# Patient Record
Sex: FEMALE | Race: WHITE | NOT HISPANIC OR LATINO | Employment: UNEMPLOYED | URBAN - METROPOLITAN AREA
[De-identification: names, ages, dates, MRNs, and addresses within clinical notes are randomized per-mention and may not be internally consistent; named-entity substitution may affect disease eponyms.]

---
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
## 2023-01-04 ENCOUNTER — OFFICE VISIT (OUTPATIENT)
Dept: OBGYN CLINIC | Facility: CLINIC | Age: 18
End: 2023-01-04

## 2023-01-04 VITALS
WEIGHT: 127 LBS | SYSTOLIC BLOOD PRESSURE: 108 MMHG | HEIGHT: 65 IN | DIASTOLIC BLOOD PRESSURE: 76 MMHG | BODY MASS INDEX: 21.16 KG/M2

## 2023-01-04 DIAGNOSIS — Z11.3 SCREENING EXAMINATION FOR STD (SEXUALLY TRANSMITTED DISEASE): Primary | ICD-10-CM

## 2023-01-04 DIAGNOSIS — N93.9 MENSTRUAL BLEEDING PROBLEM: ICD-10-CM

## 2023-01-04 NOTE — PROGRESS NOTES
Sativa Stecker 21year-old patient of [facility] here with complaint of bleeding with Joie Gist that started beginning of December, around the 4th  She would experience bleeding every 2 days - bleeding was spotting  This started after she experiencing cramping on the right side  On December 19th she started with a heavier flow that lasted until the 28th  Then it went back to the spotting  This has not occurred since the insertion of her IUD in October of 2021  She is currently being treated for a UTI  She is on day 2 of Keflex  Vaginal cultures collected in December by her prior GYN which were indeterminate  Bleeding has now stopped  She also states the string poke her left side vaginal wall and that she experiences postcoital bleeding  She has been with her current partner for 4 months  No LMP recorded (lmp unknown)  (Menstrual status: Birth Control)  ROS:  As indicated in HPI  All other ROS negative  Physical Exam  Constitutional:       Appearance: Normal appearance  Genitourinary:      No lesions in the vagina  Right Labia: No rash, tenderness, lesions, skin changes or Bartholin's cyst      Left Labia: No tenderness, lesions, skin changes, Bartholin's cyst or rash  No labial fusion noted  Vaginal discharge (mucous yellow tinged) present  No vaginal bleeding or granulation tissue  No vaginal prolapse present  No vaginal atrophy present  Cervix is nulliparous  Cervical discharge (mucous yellow tinged) and friability (mild cervicitis noted) present  IUD strings visualized (IUD string trimmed)  HENT:      Head: Normocephalic and atraumatic  Musculoskeletal:      Cervical back: Neck supple  Neurological:      Mental Status: She is alert  Skin:     General: Skin is warm  Vitals and nursing note reviewed  Anthony Zarco was seen today for menstrual problem      Diagnoses and all orders for this visit:    Screening examination for STD (sexually transmitted disease)  -     Carol(R), Vaginosis/Vaginitis Plus    Menstrual bleeding problem      I explained that there is mild inflammation noted of the cervix and this would explain the reason she experiences bleeding with intimacy  I also explained that it is not uncommon to experience irregular bleeding with an IUD  Sometimes an acute illness can also cause BTB  Culture collected for STD screening  Results will be released to [facility], if abnormal will call to review and discuss treatment plan

## 2023-01-05 LAB
M GENITALIUM DNA SPEC QL NAA+PROBE: NEGATIVE
T VAGINALIS DNA SPEC QL NAA+PROBE: NEGATIVE

## 2023-01-08 LAB
BV BACTERIA RRNA VAG QL NAA+PROBE: POSITIVE
C GLABRATA RNA VAG QL NAA+PROBE: NOT DETECTED
C TRACH RRNA SPEC QL NAA+PROBE: DETECTED
CANDIDA RRNA VAG QL PROBE: NOT DETECTED
N GONORRHOEA RRNA SPEC QL NAA+PROBE: NOT DETECTED
T VAGINALIS RRNA SPEC QL NAA+PROBE: NOT DETECTED

## 2023-01-09 ENCOUNTER — TELEPHONE (OUTPATIENT)
Dept: OBGYN CLINIC | Facility: CLINIC | Age: 18
End: 2023-01-09

## 2023-01-09 DIAGNOSIS — B96.89 BV (BACTERIAL VAGINOSIS): Primary | ICD-10-CM

## 2023-01-09 DIAGNOSIS — A74.9 CHLAMYDIA INFECTION: Primary | ICD-10-CM

## 2023-01-09 DIAGNOSIS — N76.0 BV (BACTERIAL VAGINOSIS): Primary | ICD-10-CM

## 2023-01-09 RX ORDER — FLUCONAZOLE 100 MG/1
100 TABLET ORAL
Qty: 2 TABLET | Refills: 0 | Status: SHIPPED | OUTPATIENT
Start: 2023-01-09 | End: 2023-01-13

## 2023-01-09 RX ORDER — DOXYCYCLINE 100 MG/1
100 TABLET ORAL 2 TIMES DAILY
Qty: 14 TABLET | Refills: 0 | Status: SHIPPED | OUTPATIENT
Start: 2023-01-09 | End: 2023-01-16

## 2023-01-09 RX ORDER — METRONIDAZOLE 500 MG/1
500 TABLET ORAL EVERY 12 HOURS SCHEDULED
Qty: 14 TABLET | Refills: 0 | Status: SHIPPED | OUTPATIENT
Start: 2023-01-09 | End: 2023-01-16

## 2023-01-09 NOTE — TELEPHONE ENCOUNTER
----- [facility]  8:04 AM EST -----  Please inform patient of positive Chlamydia  Doxycycline 100 mg BID for 7 days sent to pharmacy  She is to return in 3 weeks for NAILA  She is to ensure that her partner is tested and treated  They are to abstain until both have been adequately treated

## 2023-01-09 NOTE — TELEPHONE ENCOUNTER
Pt informed of (+) BV & (+) chlamydia culture results from 1/4/2022 - recom tx with Doxycycline & Flagyl, Diflucan preventatively for poss secondary yeast inf  Pt informed to abstain from intercourse until [place] culture - appt for same scheduled for 3 wks  Pt has informed her partner & has appt with pcp for f/u/tx

## 2023-01-19 ENCOUNTER — OFFICE VISIT (OUTPATIENT)
Dept: DERMATOLOGY | Facility: CLINIC | Age: 18
End: 2023-01-19

## 2023-01-19 VITALS — BODY MASS INDEX: 21.29 KG/M2 | WEIGHT: 127.8 LBS | HEIGHT: 65 IN | TEMPERATURE: 97.6 F

## 2023-01-19 DIAGNOSIS — L70.0 ACNE VULGARIS: Primary | ICD-10-CM

## 2023-01-19 NOTE — PATIENT INSTRUCTIONS
Physical Exam and Assessment/Plan by Diagnosis:    ACNE VULGARIS ("COMMON ACNE")    Assessment and Plan: We reviewed the causes of acne, the “kinds” of acne, and the expected clinical course  We discussed treatment options ranging from over-the-counter products, topical retinoids, antibiotics, BP, hormonal therapies (OCPs/spironolactone), and isotretinoin (Accutane)  We reviewed specific over-the-counter interventions and medications  Recommended typical hygiene measures including water-based facial products, washing regularly with mild cleanser, and refraining from picking and popping any pimples  Recommended non-comedogenic sunscreen use daily  Expectations of therapy discussed  Side effects, risks and benefits of medications discussed  A comprehensive handout on Acne was provided  The phone number to call in case of questions or concerns (and instructions to stop medications in such a scenario) was provided  After lengthy discussion of etiology and treatment options, we decided to implement the following personalized treatment plan:     Begin using Skin Medicinals as ordered to affected areas    May use Cetaphil facial wash                   Follow up as needed or if symptoms persist or worsen  Based on a thorough discussion of this condition and the management approach to it (including a comprehensive discussion of the known risks, side effects and potential benefits of treatment), the patient (family) agrees to implement the following specific plan:    --------------------------------------------------------------------------------------  [place]:  In the shower, wash your face, chest and back gently with Cetaphil moisturizing cleanser or Dove Fragrance-free bar  Do not use a luffa or washcloth as these tend to be too irritating to acne-prone skin      ANTIMICROBIAL BENZOYL PEROXIDE:  None  Neutrogena Clear Pore (Benzoyl Peroxide 3% wash):  In the shower, apply this medication to your face, chest and back  Leave this wash on your skin for about 5 minutes and then rinse it off completely while in the shower  If you do not rinse it off completely, then it will bleach your towels or clothing  This medication is now available without prescription (over-the-counter) in most drug stores or at [employer] for about $7 a bottle  PenOxyl wash: Apply on face leave in for 1-2 minutes and completely rinse off    Sulfacetamide sodium-sulfur wash: Apply to face daily  You may use any brand of benzoyl peroxide 10% wash over the counter    ANTIBIOTICS:      “EVENING ROUTINE”    SKIN HYGIENE:  In the shower, wash your face, chest and back gently with Cetaphil moisturizing cleanser or Dove Fragrance-free bar  Do not use a lufa or washcloth as these tend to be too irritating to acne-prone skin  ANTIBIOTICS:        TOPICAL RETINOID:  At 1 hour before bedtime (after washing your face and allowing the skin to completely dry), spread only a single pea-sized amount of this medication evenly over your entire face (avoiding your eyes or mouth):    ORAL CONTRACEPTIVE PILL:  None  Females:  Point of care urine pregnancy test:  Done TODAY  Results:  Point of care urine pregnancy test:  Not done today  Reason:  Blood pressure checked:    Done TODAY; Results:  Not done today    ORAL ISOTRETINOIN:    None  Isotretinoin 10 mg daily, 20 mg daily, 40 mg daily, 60 mg daily, 80 mg daily  Labs reviewed   Reviewed TODAY  Within normal limits and reviewed with patient  Abnormal  Females:  Point of care urine pregnancy test:  Done TODAY  Results:  Point of care urine pregnancy test:  Not done today  Reason:  Patient confirmed in [employer]:    Done TODAY  Not done today  Reason:      REMEMBER:  Always take your acne pills with lots of water! A pill stuck in your throat can cause significant burning and irritation     Drink a full glass of water to ensure the pill gets into your stomach  Avoid “popping” a pill right before bed, and stay upright for at least 1 hour after taking a pill  ACNE:  WHAT ZIT ALL ABOUT? WHY DO I HAVE ACNE/PIMPLES? Your skin is made of layers  To keep the skin from becoming dry and cracked, the skin needs oil  The oil is made in little wells in the deeper layers in the skin  People with acne have glands that make more oil and are more easily plugged, causing the glands to swell  Hormones, bacteria and your inherited tendency to have acne all play a role  The medical term for “pimples” is acne or acne vulgaris (vulgaris means “common”)  Most people get some acne  Acne does not come from being dirty  Instead, it is an expected consequence of changes that occur during normal growth and development  Hormones, bacteria, and your family's tendency to have acne may all play a role  “Whiteheads” or “blackheads” are openings of the glands (glands are the oil factories) onto the surface of the skin  “Blackheads” are not caused by dirt blocking the pores; instead, they result from the oxidation reaction of oil and skin in the pores with the air (like a “rust” reaction)  WHAT ABOUT STRESS? Stress does not “cause” acne but it can make it worse  Make sure you get enough sleep and daily exercise! WHAT ABOUT FOODS/DIET? Try to eat a balanced, healthy diet  Some people feel that certain foods worsen their acne  While there aren't many studies available on this question, severe dietary changes are unlikely to help your acne and may be harmful to the health of your skin  If you find that a certain food seems to aggravate your acne, you may consider avoiding that food  Discuss this with your physician! WHAT CAUSES MY ACNE?   There are four contributors to acne--the body's natural oil (sebum), clogged pores, bacteria (with the scientific name Propionibacterium acnes, or P  acnes, for short), and the body's reaction to the bacteria living in the clogged pores (which causes inflammation)  Here's what happens:    Sebum is produced in the normal oil-making glands in the deeper layers of the skin and reaches the surface through the skin's pores  An increase in certain hormones occurs around the time of puberty, and these hormones trigger the oil glands to produce increased amounts of sebum  Pores with excess oil tend to become clogged more easily  At the same time, P  acnes--one of the many types of bacteria that normally live on everyone's skin--thrives in the excess oil and causes a skin reaction (inflammation)  If a pore is clogged close to the surface, there is little inflammation  However, this results in the formation of “whiteheads” (closed comedones) or “blackheads” (open comedones) at the surface of the skin  A plug that extends to, or forms a little deeper in the pore, or one that enlarges or ruptures may cause more inflammation  The result is red bumps (papules) and pus-filled pimples (pustules)  If plugging happens in the deepest skin layer, the inflammation may be even more severe, resulting in the formation of nodules or cysts  When these types of acne heal, they may leave behind discolored areas or true scars  SKIN HYGIENE:  HOW SHOULD I [facility] MY SKIN? Acne does not come from being dirty, however, washing your face is part of taking good care of your skin and will help keep your face clear  Good skin hygiene is, therefore, critical to support any acne treatment plan  Here are several specific suggestions for practicing good skin hygiene and keeping your skin looking its best:    You should wash acne-prone skin TWICE A DAY: Once in the morning and once in the evening  This does include any showers you take that day, so do not overdo it! Do not scrub the skin with a washcloth or loofah as these can irritate and inflame your acne  Acne does not come from “dirt”, so it is not necessary to scrub the skin clean   In fact, scrubbing may lead to dryness and irritation that makes the acne even worse and harder for patients to tolerate acne medications  Use a gentle facial moisturizing cleanser (Cetaphil Moisturizing Cleanser or Dove Fragrance-Free bar)  Avoid using soaps like Amanda Dolan [address], [facility], or soft/liquid soaps as these products will dry your skin  Do not use any over-the-counter “acne washes” without your doctor's specific instruction to do so  These products often contain salicylic acid or benzoyl peroxide  These ingredients can be helpful in clearing oil from the skin and reducing bacteria, but they may also be drying and can add to irritation  Do not use exfoliating products with microbeads or brushes as these can cause irritation to the skin  Facials and other treatments to remove, squeeze, or “clean out” pores are not recommended  Manipulating the skin in this way can make acne worse and can lead to severe infections and/or scarring  It also increases the likelihood that the skin will not be able to tolerate acne medications  Try not to “pop pimples” or pick at your acne as this can delay healing and may result in scarring or skin color changes (“dark spots”) that are often more noticeable than the acne itself  Picking/popping acne can also cause a serious skin infection  Wash or change your pillow case once to twice a week, especially if you use products in your hair  Wash the skin as soon as possible after playing sports or other activities that cause a lot of sweating  Also, pay attention to how your sports equipment (shoulder pads, helmet strap, etc ) might be making your acne worse  When you use makeup, moisturizer, or sunscreen make sure that these products are labeled “non-comedogenic,” or “won't clog pores,” or “won't cause acne ”       SHOULD I TREAT MY ACNE? There are a number of other skin conditions that can look like acne   If there is any question about the diagnosis, then the person should be evaluated by a board certified pediatric and adolescent dermatologist   A physician should examine any child with acne who is between the ages of 3and 9years of age, as acne in this “mid-childhood” age group is not normal and may signal an underlying problem  If a “preadolescent” (9to 6years of age) or “adolescent” (15to 25years of age) has mild acne and the condition is not bothersome to the individual, proper and regular skin care (what your doctor may call “skin hygiene”) may be all that is needed at this point  Many people do, however, need specific acne medications to help their skin look and feel its best  Your doctor will tell you if you are one of these people  If so, you may be advised to use an over-the-counter or prescription medication that is applied to the skin (a “topical medication”) or if the addition of an oral medication (a medication “taken by Re Pet) is needed  The good news is that the medications work well when used properly! Some specific factors that may influence the choice of acne therapy include:    Severity  The number and type of skin lesions (papules or comedones) and the degree of inflammation (mild, moderate or severe)  Scarring  Scarring is most common when acne is severe, but it can happen even in children with mild acne  Impact  If a child is experiencing emotional complications because of the acne or is experiencing negative comments from other children  Cost of the acne medications  An acne expert can help to keep “out of pocket” costs to a minimum by utilizing the correct medications and the least expensive options  The patient's skin type (“oily” versus “dry” or “combination skin,” for example)  Potential side effects of the medication  The ease or overall complexity of the treatment plan or medication  WHAT ACNE TREATMENTS ARE AVAILABLE?     Medications for acne try to stop the formation of new pimples by reducing or removing the oil, bacteria, and other things (like dead skin cells) that clog the pores  They can also decrease the inflammation or irritation response of the skin to bacteria  It may take from 6 to 8 weeks (about 2 months!) before you see any improvement and know if the medication is effective  It takes the layers of skin this long to regenerate  Remember, these medications do not “cure” the condition--the acne improves because of the medication  Therefore, treatment must be continued in order to prevent the return of acne lesions  There are many types of acne treatments  Some are applied to the skin (“topical” medications) and some are taken by mouth (“oral” medications)  In most cases of mild acne, the doctor will start with a topical medication  There are many different topical medications that are helpful for acne  If acne is more severe and it does not respond adequately to a topical medication, or if it covers large body surface areas such as the back and/or chest, oral antibiotics such as Doxycycline or Minocycline and/or oral hormone therapy such as Oral Contraceptive Pills or Spironolactone may be prescribed  In the most severe cases, isotretinoin (Accutane) may be used  In general, it is usually best to start with acne medications that are least likely to cause side effects but are at the same time capable of addressing the specific causes for the acne  Some patients have a good result with just one medication, but many will need to use a combination of treatments: two or more different topical agents or an oral medication plus a topical medication  Another treatment used for acne may include corticosteroid injections, which are used to help relieve pain, decrease the size, and encourage the healing of large, inflamed acne nodules  Also, dermatologists sometimes perform “acne surgery,” using a fine needle, a pointed blade, or an instrument known as a comedone extractor to mechanically clean out clogged pores   One must always weigh the risk for inducing a scar with the potential benefits of any procedure  Prior treatment with topical retinoids can “loosen” whiteheads and blackheads and make it easier to physically remove such lesions  Heat-based devices, and light and laser therapy are being studied to see whether there is any role for such treatments in mild to moderate acne  At this time, there is not enough evidence to make general recommendations about their use  TOPICAL ACNE MEDICATIONS    WHAT KIND OF TOPICALS ARE THERE? Benzoyl peroxide (BP) helps to fight inflammation and is anti-microbial (kills bacteria, viruses, and other microorganisms) and is believed to help prevent resistance of bacteria to topical antibiotics  A benzoyl peroxide “wash” may be recommended for use on large areas such as the chest and/or back  Mild irritation and dryness are common when first using benzoyl peroxide-containing products  Be careful because benzoyl peroxide can bleach towels and clothing! Retinoids (such as adapalene, tretinoin, or tazarotene) unplug the oil glands by helping peel away the layers of skin and other things plugging the opening of the glands  Mild irritation and dryness are common when first using these products  Facial waxing and other skin procedures can lead to excessive irritation and should be avoided during retinoid therapy  Antibiotics fight bacteria and help decrease inflammation  Topical antibiotics commonly used in acne include clindamycin, erythromycin, and combination agents (such as clindamycin/benzoyl peroxide or erythromycin/benzoyl peroxide)  Mild irritation and dryness are common when first using these products  Typically, topical antibiotics should not be used alone as treatment for acne  Other topical agents include salicylic acid, azelaic acid, dapsone, and sulfacetamide  Mild irritation and dryness can also occur when first using these products      USING YOUR TOPICAL TREATMENTS LIKE A PRO  Apply topical medications only to clean, dry skin  Topical medications may lead to significant dryness of the affected areas  To minimize this, wait 15-20 minutes after washing before applying your topical medication  These medications work deep in the skin to prevent new breakouts  “Spot treatment” of individual pimples does not do much  When applying topical medications to the face, use the “5-dot” method  Start by placing a small pea-sized amount of the medication on your finger  Then, place “dots” in each of five locations of your face: Mid-forehead, each cheek, nose, and chin  Next, rub the medication into the entire area of skin - not just on individual pimples! Try to avoid the delicate skin around your eyes and corners of your mouth  The medications are not magic! They take weeks if not months to work  Be patient and use your medicine on a daily basis or as directed for six weeks before asking if your skin looks better  Try not to miss more than one or two days each week when using your medications  If you are starting a new medication, then try using it “every other night” or even “every third night ” Gradually work up to [facility] a day ”  This will give your skin time to adjust   The same medications often come in various forms or formulations: Creams, ointments, lotions, gels, microspheres, or foams  Use the formulation that has been recommended and don't switch to other forms unless instructed  Some forms (such as alcohol based gels) may be more drying and less tolerable for certain skin types  Sometimes individual medications are not as effective as a combination of two or more agents  The doctor may need to try several medications or combinations before finding the one that is best for that patient  Moisturizer, sunscreen, and make-up may be used in conjunction with topical acne medications  In general, acne medications are applied first so they may directly contact the skin   Ask your physician to review specific application instructions! It is especially important to always use sunscreen when using a topical retinoid or oral antibiotic  These drugs can make your skin more sensitive to the sun  In general, sunscreen gets applied AFTER any acne medications  Don't stop using your acne medications just because your acne got better  Remember, the acne is better because of the medication, and prevention is the fried to treatment  ORAL ACNE MEDICATIONS    ORAL ANTIBIOTICS  Antibiotics include tetracycline-class medicines (which include the most commonly used oral antibiotics for acne, minocycline, and doxycycline), erythromycin, trimethoprim-sulfamethoxazole, and occasionally cephalexin or azithromycin  These drugs may decrease bacteria and inflammation, and they are most effective for moderate-to-severe “inflammatory” acne  A product containing benzoyl peroxide should be used along with these antibiotics to help decrease the possibility of microbial resistance  Always take your acne pills with lots of water! A pill stuck in your throat can cause significant burning and irritation  Drink a full glass of water to ensure the pill gets into your stomach  Avoid “popping” a pill right before bed, and stay upright for at least 1 hour after taking a pill  DOXYCYCLINE   This medication is usually taken ONCE or TWICE per day, as instructed by your physician  NOTE: Always take this medication with lots of water! A pill stuck in the throat can cause significant burning and irritation  Avoid “popping” a pill right before bed & stay upright for at least one hour after taking a pill  WARNING: Doxycycline increases your sensitivity to the sun, so practice excellent sun protection!  If you notice any of the following, stop using the medication and notify your health care provider: headaches; blurred vision; dizziness; sun sensitivity; heartburn-stomach pain; irritation of the esophagus; darkening of scars, gums, or teeth (more often with minocycline); nail changes; yellowing of the eyes or skin (indicating possible liver disease); joint pains-and flu-like symptoms  Taking oral antibiotics with food may help with symptoms of upset stomach  COMMON SIDE EFFECTS: Headaches; dizziness; sun sensitivity; irritation of the throat; discoloration of scars, gums, or teeth; nail changes  MINOCYCLINE   This medication is usually taken ONCE or TWICE per day, as instructed by your physician  NOTE: Always take this medication with lots of water! A pill stuck in the throat can cause significant burning and irritation  Avoid “popping” a pill right before bed & stay upright for at least one hour after taking a pill  WARNING: Though less likely than doxycycline, minocycline may increase your sensitivity to the sun, so practice excellent sun protection! If you notice any of the following, stop using the medication and notify your health care provider: headaches; blurred vision; dizziness; sun sensitivity; heartburn-stomach pain; irritation of the throat; darkening of scars, gums, or teeth; nail changes; yellowing of the eyes or skin (indicating possible liver disease); joint pains-and flu-like symptoms  Taking oral antibiotics with food may help with symptoms of upset stomach  COMMON SIDE EFFECTS: Headaches; dizziness; sun sensitivity; irritation of the throat; discoloration of scars, gums, or teeth (often with minocycline); nail changes  Minocycline can rarely cause liver disease, joint pains, severe skin rashes, and flu-like symptoms  If you should notice yellowing of the eyes or skin, or any of the above, notify your doctor and stop using the medication immediately  HORMONAL THERAPY  Hormonal treatment is used only in females and usually consists of oral contraceptives (birth control pills)  Spironolactone is also sometimes used      ORAL CONTRACEPTIVE PILLS   This medication is also known as the “Birth Control Pill ”  We use it for hormonal regulation of acne  Take this medication as directed on the medication packet  NOTE: Try to find a regular time in your day to take the pill so that you don't forget  The best time is about half an hour after a meal or snack, or at bedtime  If you do forget to take your daily pill at the regular time, take one as soon as you remember and take the next at your regular scheduled time  WARNING: Do not take this medication until discussing it with your physician if you smoke, are pregnant (or trying to become pregnant or could be pregnant), have a personal history of breast cancer, have any artificial hardware or implants, have a condition called Factor 5 Leiden deficiency, have a family history of clotting problems, regularly have migraine headaches (especially with aura or due to flashing lights), or have any vaginal bleeding other than that associated with your menstrual cycle  ORAL ISOTRETINOIN (used to be called the brand name “ACCUTANE”)  Isotretinoin, a derivative of vitamin A, is a powerful drug with several significant potential side effects  It is reserved for acne which is severe or when other medications have not worked well enough  It used to be sold under the brand name “Accutane” but now several versions exist       HAVING PROBLEMS WITH ANY OF YOUR TREATMENTS? You should not be able to see any of the medicines on your face  If you can see a white film on your skin after you apply the medication, there is too much medicine in that area and you need to apply a thinner coat and make sure it is spread evenly on your face  If your skin gets too dry, you can apply a light (“non-comedogenic”) moisturizer on top of your medicine or you may switch to using the medicine every other day instead of every day  If your skin is still too irritated, you may need to switch to a milder medication        If your skin is red and very itchy, you may be allergic to the medication and you should stop using it  COMMON POSSIBLE SIDE EFFECTS OF MEDICATIONS    Retinoids - dryness, redness, increased sun sensitivity  Benzoyl peroxide - drying, redness, bleaching of clothes, towels and sheets, allergy  Doxycycline - headaches; dizziness; irritation of the throat; nail changes; discoloration of teeth  Sun sensitivity - even if you have dark skin, this medicine can make you burn more easily  Make sure you protect yourself from the sun, either by avoiding being outside between 11 AM and 3 PM, wearing and reapplying sunscreen/sunblock, or wearing sun protective clothing  Nausea/vomiting - if you experience nausea with this medication, take it with food  Minocycline - headaches; dizziness; vision problems,  irritation of the throat; discoloration of scars, gums, or teeth  Can rarely cause liver disease, joint pains, and flu-like symptoms  If you should notice yellowing of the skin or any of the above, notify your doctor and stop using the medication  Birth Control Pills - nausea; headaches; breast tenderness; feeling bloated; mood changes  Spotting between periods may occur for the first three weeks of the medication, but this is not serious  It may last for two or three cycles  Please call us if the bleeding is heavier than a light flow or lasts for more than a few days  WHEN AND WHERE TO CALL WITH CONCERNS  We are here to help! If you experience any unusual symptoms, then stop taking or using the medication and call our office at [phone] ([phone])  It is better to be safe than to be sorry! [facility]     We use this service to prescribe medications that are often not covered by insurance  Your physician will send your prescription to the pharmacy  You will receive an email from [email or web address] where you will follow the instructions within the email to provide your billing and shipping information  Your medicine will be shipped directly to you      If you have any questions, you can call [phone] or email [email or web address]

## 2023-01-19 NOTE — PROGRESS NOTES
[facility] Note     Patient Name: Aguila Porras  Encounter Date: 01/19/2023     Have you been cared for by a [place] [id number] Dermatologist in the last 3 years and, if so, which description applies to you? Yes  I have been here within the last 3 years, and my medical history has NOT changed since that time  I am FEMALE/of child-bearing potential     REVIEW OF SYSTEMS:  Have you recently had or currently have any of the following? · No changes in my recent health  PAST MEDICAL HISTORY:  Have you personally ever had or currently have any of the following? If "YES," then please provide more detail  · No changes in my medical history  FAMILY HISTORY:  Any "first degree relatives" (parent, brother, sister, or child) with the following? • No changes in my family's known health  PATIENT EXPERIENCE:    • Do you want the Dermatologist to perform a COMPLETE skin exam today including a clinical examination under the "bra and underwear" areas? NO  • If necessary, do we have your permission to call and leave a detailed message on your Preferred Phone number that includes your specific medical information? Yes      No Known Allergies   Current Outpatient Medications:   •  Adapalene-Benzoyl Peroxide 0 1-2 5 % gel, Apply to skin once daily, Disp: 45 g, Rfl: 3  •  ketoconazole (NIZORAL) 2 % shampoo, Apply 1 application topically once for 1 dose SHMP 2x/wk x 8 wk, then as needed  Leave on 5 min then rinse , Disp: 120 mL, Rfl: 2  •  levonorgestrel (KYLEENA) 19 5 MG intrauterine device, 1 each by Intrauterine route once, Disp: , Rfl:   •  triamcinolone (KENALOG) 0 1 % ointment, Apply twice daily for up to 2 weeks, as needed for irritation  , Disp: 30 g, Rfl: 0          • Whom besides the patient is providing clinical information about today's encounter?   o NO ADDITIONAL HISTORIAN (patient alone provided history)    Physical Exam and Assessment/Plan by Diagnosis:    ACNE VULGARIS ("COMMON ACNE")    Physical Exam:  • Psychiatric/Mood:Normal  • Anatomic Location Affected:  Face, chest and back; see attached photos  •   • Pertinent Positives:  • Pertinent Negatives: Additional History of Present Condition:  Patient reports use of adapalene-benzoyl peroxide along with a daily moisturizer irritated her face  Assessment and Plan:  • We reviewed the causes of acne, the “kinds” of acne, and the expected clinical course  • We discussed treatment options ranging from over-the-counter products, topical retinoids, antibiotics, BP, hormonal therapies (OCPs/spironolactone), and isotretinoin (Accutane)  • We reviewed specific over-the-counter interventions and medications  Recommended typical hygiene measures including water-based facial products, washing regularly with mild cleanser, and refraining from picking and popping any pimples  • Recommended non-comedogenic sunscreen use daily  • Expectations of therapy discussed  Side effects, risks and benefits of medications discussed  • A comprehensive handout on Acne was provided  • The phone number to call in case of questions or concerns (and instructions to stop medications in such a scenario) was provided  • After lengthy discussion of etiology and treatment options, we decided to implement the following personalized treatment plan:  ·    Begin using Skin Medicinals as ordered to affected areas  ·   May use Cetaphil facial wash  ·                  Follow up as needed or if symptoms persist or worsen  Based on a thorough discussion of this condition and the management approach to it (including a comprehensive discussion of the known risks, side effects and potential benefits of treatment), the patient (family) agrees to implement the following specific plan:  [facility]     We use this service to prescribe medications that are often not covered by insurance  Your physician will send your prescription to the pharmacy      You will receive an email from [employer] where you will follow the instructions within the email to provide your billing and shipping information  Your medicine will be shipped directly to you  If you have any questions, you can call [phone] or email [email or web address]    Tretinoin: 0 025%  Niacinamide: 2%  Hyaluronic Acid: 0 25%  Vehicle: Cream     --------------------------------------------------------------------------------------  YOUR PERSONALIZED ACNE ACTION PLAN    “MORNING ROUTINE”    1) SKIN HYGIENE:  In the shower, wash your face, chest and back gently with Cetaphil moisturizing cleanser or Dove Fragrance-free bar  Do not use a luffa or washcloth as these tend to be too irritating to acne-prone skin  2) ANTIMICROBIAL BENZOYL PEROXIDE:  • None  • Neutrogena Clear Pore (Benzoyl Peroxide 3% wash): In the shower, apply this medication to your face, chest and back  Leave this wash on your skin for about 5 minutes and then rinse it off completely while in the shower  If you do not rinse it off completely, then it will bleach your towels or clothing  This medication is now available without prescription (over-the-counter) in most drug stores or at [employer] for about $7 a bottle  • PenOxyl wash: Apply on face leave in for 1-2 minutes and completely rinse off    • Sulfacetamide sodium-sulfur wash: Apply to face daily  • You may use any brand of benzoyl peroxide 10% wash over the counter    3) ANTIBIOTICS:      “EVENING ROUTINE”    1) SKIN HYGIENE:  In the shower, wash your face, chest and back gently with Cetaphil moisturizing cleanser or Dove Fragrance-free bar  Do not use a lufa or washcloth as these tend to be too irritating to acne-prone skin      2) ANTIBIOTICS:        3) TOPICAL RETINOID:  At 1 hour before bedtime (after washing your face and allowing the skin to completely dry), spread only a single pea-sized amount of this medication evenly over your entire face (avoiding your eyes or mouth):      REMEMBER:  Always take your acne pills with lots of water! A pill stuck in your throat can cause significant burning and irritation  Drink a full glass of water to ensure the pill gets into your stomach  Avoid “popping” a pill right before bed, and stay upright for at least 1 hour after taking a pill  ACNE:  WHAT ZIT ALL ABOUT? WHY DO I HAVE ACNE/PIMPLES? Your skin is made of layers  To keep the skin from becoming dry and cracked, the skin needs oil  The oil is made in little wells in the deeper layers in the skin  People with acne have glands that make more oil and are more easily plugged, causing the glands to swell  Hormones, bacteria and your inherited tendency to have acne all play a role  The medical term for “pimples” is acne or acne vulgaris (vulgaris means “common”)  Most people get some acne  Acne does not come from being dirty  Instead, it is an expected consequence of changes that occur during normal growth and development  Hormones, bacteria, and your family's tendency to have acne may all play a role  “Whiteheads” or “blackheads” are openings of the glands (glands are the oil factories) onto the surface of the skin  “Blackheads” are not caused by dirt blocking the pores; instead, they result from the oxidation reaction of oil and skin in the pores with the air (like a “rust” reaction)  WHAT ABOUT STRESS? Stress does not “cause” acne but it can make it worse  Make sure you get enough sleep and daily exercise! WHAT ABOUT FOODS/DIET? Try to eat a balanced, healthy diet  Some people feel that certain foods worsen their acne  While there aren't many studies available on this question, severe dietary changes are unlikely to help your acne and may be harmful to the health of your skin  If you find that a certain food seems to aggravate your acne, you may consider avoiding that food  Discuss this with your physician!     WHAT CAUSES MY ACNE?  There are four contributors to acne--the body's natural oil (sebum), clogged pores, bacteria (with the scientific name Propionibacterium acnes, or P  acnes, for short), and the body's reaction to the bacteria living in the clogged pores (which causes inflammation)  Here's what happens:    • Sebum is produced in the normal oil-making glands in the deeper layers of the skin and reaches the surface through the skin's pores  An increase in certain hormones occurs around the time of puberty, and these hormones trigger the oil glands to produce increased amounts of sebum  • Pores with excess oil tend to become clogged more easily  • At the same time, P  acnes--one of the many types of bacteria that normally live on everyone's skin--thrives in the excess oil and causes a skin reaction (inflammation)  • If a pore is clogged close to the surface, there is little inflammation  However, this results in the formation of “whiteheads” (closed comedones) or “blackheads” (open comedones) at the surface of the skin  • A plug that extends to, or forms a little deeper in the pore, or one that enlarges or ruptures may cause more inflammation  The result is red bumps (papules) and pus-filled pimples (pustules)  • If plugging happens in the deepest skin layer, the inflammation may be even more severe, resulting in the formation of nodules or cysts  When these types of acne heal, they may leave behind discolored areas or true scars  SKIN HYGIENE:  HOW SHOULD I [facility] MY SKIN? Acne does not come from being dirty, however, washing your face is part of taking good care of your skin and will help keep your face clear  Good skin hygiene is, therefore, critical to support any acne treatment plan  Here are several specific suggestions for practicing good skin hygiene and keeping your skin looking its best:    • You should wash acne-prone skin TWICE A DAY: Once in the morning and once in the evening   This does include any showers you take that day, so do not overdo it! • Do not scrub the skin with a washcloth or loofah as these can irritate and inflame your acne  Acne does not come from “dirt”, so it is not necessary to scrub the skin clean  In fact, scrubbing may lead to dryness and irritation that makes the acne even worse and harder for patients to tolerate acne medications  • Use a gentle facial moisturizing cleanser (Cetaphil Moisturizing Cleanser or Dove Fragrance-Free bar)  Avoid using soaps like Rachel Mckenna, Amanda Murray 39, [facility], or soft/liquid soaps as these products will dry your skin  • Do not use any over-the-counter “acne washes” without your doctor's specific instruction to do so  These products often contain salicylic acid or benzoyl peroxide  These ingredients can be helpful in clearing oil from the skin and reducing bacteria, but they may also be drying and can add to irritation  • Do not use exfoliating products with microbeads or brushes as these can cause irritation to the skin  • Facials and other treatments to remove, squeeze, or “clean out” pores are not recommended  Manipulating the skin in this way can make acne worse and can lead to severe infections and/or scarring  It also increases the likelihood that the skin will not be able to tolerate acne medications  • Try not to “pop pimples” or pick at your acne as this can delay healing and may result in scarring or skin color changes (“dark spots”) that are often more noticeable than the acne itself  Picking/popping acne can also cause a serious skin infection  • Wash or change your pillow case once to twice a week, especially if you use products in your hair  • Wash the skin as soon as possible after playing sports or other activities that cause a lot of sweating  Also, pay attention to how your sports equipment (shoulder pads, helmet strap, etc ) might be making your acne worse    • When you use makeup, moisturizer, or sunscreen make sure that these products are labeled “non-comedogenic,” or “won't clog pores,” or “won't cause acne ”       SHOULD I TREAT MY ACNE? There are a number of other skin conditions that can look like acne  If there is any question about the diagnosis, then the person should be evaluated by a board certified pediatric and adolescent dermatologist   A physician should examine any child with acne who is between the ages of 3and 9years of age, as acne in this “mid-childhood” age group is not normal and may signal an underlying problem  If a “preadolescent” (9to 6years of age) or “adolescent” (15to 25years of age) has mild acne and the condition is not bothersome to the individual, proper and regular skin care (what your doctor may call “skin hygiene”) may be all that is needed at this point  Many people do, however, need specific acne medications to help their skin look and feel its best  Your doctor will tell you if you are one of these people  If so, you may be advised to use an over-the-counter or prescription medication that is applied to the skin (a “topical medication”) or if the addition of an oral medication (a medication “taken by [place]) is needed  The good news is that the medications work well when used properly! Some specific factors that may influence the choice of acne therapy include:    • Severity  The number and type of skin lesions (papules or comedones) and the degree of inflammation (mild, moderate or severe)  • Scarring  Scarring is most common when acne is severe, but it can happen even in children with mild acne  • Impact  If a child is experiencing emotional complications because of the acne or is experiencing negative comments from other children  • Cost of the acne medications  An acne expert can help to keep “out of pocket” costs to a minimum by utilizing the correct medications and the least expensive options    • The patient's skin type (“oily” versus “dry” or “combination skin,” for example)  • Potential side effects of the medication  • The ease or overall complexity of the treatment plan or medication  WHAT ACNE TREATMENTS ARE AVAILABLE? Medications for acne try to stop the formation of new pimples by reducing or removing the oil, bacteria, and other things (like dead skin cells) that clog the pores  They can also decrease the inflammation or irritation response of the skin to bacteria  It may take from 6 to 8 weeks (about 2 months!) before you see any improvement and know if the medication is effective  It takes the layers of skin this long to regenerate  Remember, these medications do not “cure” the condition--the acne improves because of the medication  Therefore, treatment must be continued in order to prevent the return of acne lesions  There are many types of acne treatments  Some are applied to the skin (“topical” medications) and some are taken by mouth (“oral” medications)  In most cases of mild acne, the doctor will start with a topical medication  There are many different topical medications that are helpful for acne  If acne is more severe and it does not respond adequately to a topical medication, or if it covers large body surface areas such as the back and/or chest, oral antibiotics such as Doxycycline or Minocycline and/or oral hormone therapy such as Oral Contraceptive Pills or Spironolactone may be prescribed  In the most severe cases, isotretinoin (Accutane) may be used  In general, it is usually best to start with acne medications that are least likely to cause side effects but are at the same time capable of addressing the specific causes for the acne  Some patients have a good result with just one medication, but many will need to use a combination of treatments: two or more different topical agents or an oral medication plus a topical medication       Another treatment used for acne may include corticosteroid injections, which are used to help relieve pain, decrease the size, and encourage the healing of large, inflamed acne nodules  Also, dermatologists sometimes perform “acne surgery,” using a fine needle, a pointed blade, or an instrument known as a comedone extractor to mechanically clean out clogged pores  One must always weigh the risk for inducing a scar with the potential benefits of any procedure  Prior treatment with topical retinoids can “loosen” whiteheads and blackheads and make it easier to physically remove such lesions  Heat-based devices, and light and laser therapy are being studied to see whether there is any role for such treatments in mild to moderate acne  At this time, there is not enough evidence to make general recommendations about their use  TOPICAL ACNE MEDICATIONS    WHAT KIND OF TOPICALS ARE THERE? • Benzoyl peroxide (BP) helps to fight inflammation and is anti-microbial (kills bacteria, viruses, and other microorganisms) and is believed to help prevent resistance of bacteria to topical antibiotics  A benzoyl peroxide “wash” may be recommended for use on large areas such as the chest and/or back  Mild irritation and dryness are common when first using benzoyl peroxide-containing products  Be careful because benzoyl peroxide can bleach towels and clothing! • Retinoids (such as adapalene, tretinoin, or tazarotene) unplug the oil glands by helping peel away the layers of skin and other things plugging the opening of the glands  Mild irritation and dryness are common when first using these products  Facial waxing and other skin procedures can lead to excessive irritation and should be avoided during retinoid therapy  • Antibiotics fight bacteria and help decrease inflammation  Topical antibiotics commonly used in acne include clindamycin, erythromycin, and combination agents (such as clindamycin/benzoyl peroxide or erythromycin/benzoyl peroxide)  Mild irritation and dryness are common when first using these products   Typically, topical antibiotics should not be used alone as treatment for acne  • Other topical agents include salicylic acid, azelaic acid, dapsone, and sulfacetamide  Mild irritation and dryness can also occur when first using these products  USING YOUR TOPICAL TREATMENTS LIKE A PRO  • Apply topical medications only to clean, dry skin  Topical medications may lead to significant dryness of the affected areas  To minimize this, wait 15-20 minutes after washing before applying your topical medication  • These medications work deep in the skin to prevent new breakouts  “Spot treatment” of individual pimples does not do much  When applying topical medications to the face, use the “5-dot” method  Start by placing a small pea-sized amount of the medication on your finger  Then, place “dots” in each of five locations of your face: Mid-forehead, each cheek, nose, and chin  Next, rub the medication into the entire area of skin - not just on individual pimples! Try to avoid the delicate skin around your eyes and corners of your mouth  • The medications are not magic! They take weeks if not months to work  Be patient and use your medicine on a daily basis or as directed for six weeks before asking if your skin looks better  Try not to miss more than one or two days each week when using your medications  • If you are starting a new medication, then try using it “every other night” or even “every third night ” Gradually work up to [employer] a day ”  This will give your skin time to adjust   • The same medications often come in various forms or formulations: Creams, ointments, lotions, gels, microspheres, or foams  Use the formulation that has been recommended and don't switch to other forms unless instructed  Some forms (such as alcohol based gels) may be more drying and less tolerable for certain skin types  • Sometimes individual medications are not as effective as a combination of two or more agents   The doctor may need to try several medications or combinations before finding the one that is best for that patient  • Moisturizer, sunscreen, and make-up may be used in conjunction with topical acne medications  In general, acne medications are applied first so they may directly contact the skin  Ask your physician to review specific application instructions! • It is especially important to always use sunscreen when using a topical retinoid or oral antibiotic  These drugs can make your skin more sensitive to the sun  In general, sunscreen gets applied AFTER any acne medications  • Don't stop using your acne medications just because your acne got better  Remember, the acne is better because of the medication, and prevention is the fried to treatment  ORAL ACNE MEDICATIONS    ORAL ANTIBIOTICS  Antibiotics include tetracycline-class medicines (which include the most commonly used oral antibiotics for acne, minocycline, and doxycycline), erythromycin, trimethoprim-sulfamethoxazole, and occasionally cephalexin or azithromycin  These drugs may decrease bacteria and inflammation, and they are most effective for moderate-to-severe “inflammatory” acne  A product containing benzoyl peroxide should be used along with these antibiotics to help decrease the possibility of microbial resistance  Always take your acne pills with lots of water! A pill stuck in your throat can cause significant burning and irritation  Drink a full glass of water to ensure the pill gets into your stomach  Avoid “popping” a pill right before bed, and stay upright for at least 1 hour after taking a pill  DOXYCYCLINE   This medication is usually taken ONCE or TWICE per day, as instructed by your physician  NOTE: Always take this medication with lots of water! A pill stuck in the throat can cause significant burning and irritation  Avoid “popping” a pill right before bed & stay upright for at least one hour after taking a pill     WARNING: Doxycycline increases your sensitivity to the sun, so practice excellent sun protection! If you notice any of the following, stop using the medication and notify your health care provider: headaches; blurred vision; dizziness; sun sensitivity; heartburn-stomach pain; irritation of the esophagus; darkening of scars, gums, or teeth (more often with minocycline); nail changes; yellowing of the eyes or skin (indicating possible liver disease); joint pains-and flu-like symptoms  Taking oral antibiotics with food may help with symptoms of upset stomach  COMMON SIDE EFFECTS: Headaches; dizziness; sun sensitivity; irritation of the throat; discoloration of scars, gums, or teeth; nail changes  MINOCYCLINE   This medication is usually taken ONCE or TWICE per day, as instructed by your physician  NOTE: Always take this medication with lots of water! A pill stuck in the throat can cause significant burning and irritation  Avoid “popping” a pill right before bed & stay upright for at least one hour after taking a pill  WARNING: Though less likely than doxycycline, minocycline may increase your sensitivity to the sun, so practice excellent sun protection! If you notice any of the following, stop using the medication and notify your health care provider: headaches; blurred vision; dizziness; sun sensitivity; heartburn-stomach pain; irritation of the throat; darkening of scars, gums, or teeth; nail changes; yellowing of the eyes or skin (indicating possible liver disease); joint pains-and flu-like symptoms  Taking oral antibiotics with food may help with symptoms of upset stomach  COMMON SIDE EFFECTS: Headaches; dizziness; sun sensitivity; irritation of the throat; discoloration of scars, gums, or teeth (often with minocycline); nail changes  Minocycline can rarely cause liver disease, joint pains, severe skin rashes, and flu-like symptoms   If you should notice yellowing of the eyes or skin, or any of the above, notify your doctor and stop using the medication immediately  HORMONAL THERAPY  Hormonal treatment is used only in females and usually consists of oral contraceptives (birth control pills)  Spironolactone is also sometimes used  ORAL CONTRACEPTIVE PILLS   This medication is also known as the “Birth Control Pill ”  We use it for hormonal regulation of acne  Take this medication as directed on the medication packet  NOTE: Try to find a regular time in your day to take the pill so that you don't forget  The best time is about half an hour after a meal or snack, or at bedtime  If you do forget to take your daily pill at the regular time, take one as soon as you remember and take the next at your regular scheduled time  WARNING: Do not take this medication until discussing it with your physician if you smoke, are pregnant (or trying to become pregnant or could be pregnant), have a personal history of breast cancer, have any artificial hardware or implants, have a condition called Factor 5 Leiden deficiency, have a family history of clotting problems, regularly have migraine headaches (especially with aura or due to flashing lights), or have any vaginal bleeding other than that associated with your menstrual cycle  ORAL ISOTRETINOIN (used to be called the brand name “ACCUTANE”)  Isotretinoin, a derivative of vitamin A, is a powerful drug with several significant potential side effects  It is reserved for acne which is severe or when other medications have not worked well enough  It used to be sold under the brand name “Accutane” but now several versions exist       HAVING PROBLEMS WITH ANY OF YOUR TREATMENTS? You should not be able to see any of the medicines on your face  If you can see a white film on your skin after you apply the medication, there is too much medicine in that area and you need to apply a thinner coat and make sure it is spread evenly on your face        If your skin gets too dry, you can apply a light (“non-comedogenic”) moisturizer on top of your medicine or you may switch to using the medicine every other day instead of every day  If your skin is still too irritated, you may need to switch to a milder medication  If your skin is red and very itchy, you may be allergic to the medication and you should stop using it  COMMON POSSIBLE SIDE EFFECTS OF MEDICATIONS    • Retinoids - dryness, redness, increased sun sensitivity  • Benzoyl peroxide - drying, redness, bleaching of clothes, towels and sheets, allergy  • Doxycycline - headaches; dizziness; irritation of the throat; nail changes; discoloration of teeth  • Sun sensitivity - even if you have dark skin, this medicine can make you burn more easily  Make sure you protect yourself from the sun, either by avoiding being outside between 11 AM and 3 PM, wearing and reapplying sunscreen/sunblock, or wearing sun protective clothing  • Nausea/vomiting - if you experience nausea with this medication, take it with food  • Minocycline - headaches; dizziness; vision problems,  irritation of the throat; discoloration of scars, gums, or teeth  Can rarely cause liver disease, joint pains, and flu-like symptoms  • If you should notice yellowing of the skin or any of the above, notify your doctor and stop using the medication  • Birth Control Pills - nausea; headaches; breast tenderness; feeling bloated; mood changes  • Spotting between periods may occur for the first three weeks of the medication, but this is not serious  It may last for two or three cycles  Please call us if the bleeding is heavier than a light flow or lasts for more than a few days  WHEN AND WHERE TO CALL WITH CONCERNS  We are here to help! If you experience any unusual symptoms, then stop taking or using the medication and call our office at [phone] ([phone])  It is better to be safe than to be sorry!       [facility]     We use this service to prescribe medications that are often not covered by insurance  Your physician will send your prescription to the pharmacy  You will receive an email from [email or web address] where you will follow the instructions within the email to provide your billing and shipping information  Your medicine will be shipped directly to you  If you have any questions, you can call [phone] or email [email or web address]        Scribe Attestation    I,:  Mony Long am acting as a scribe while in the presence of the attending physician :       I,:  Renetta Julian MD personally performed the services described in this documentation    as scribed in my presence :

## 2023-02-01 ENCOUNTER — OFFICE VISIT (OUTPATIENT)
Dept: OBGYN CLINIC | Facility: CLINIC | Age: 18
End: 2023-02-01

## 2023-02-01 VITALS — SYSTOLIC BLOOD PRESSURE: 104 MMHG | WEIGHT: 125 LBS | BODY MASS INDEX: 20.8 KG/M2 | DIASTOLIC BLOOD PRESSURE: 74 MMHG

## 2023-02-01 DIAGNOSIS — N90.89 VULVAR IRRITATION: Primary | ICD-10-CM

## 2023-02-01 DIAGNOSIS — R30.0 BURNING WITH URINATION: ICD-10-CM

## 2023-02-01 DIAGNOSIS — A74.9 CHLAMYDIA: ICD-10-CM

## 2023-02-01 LAB
BACTERIA UR QL AUTO: ABNORMAL /HPF
BILIRUB UR QL STRIP: NEGATIVE
CLARITY UR: ABNORMAL
COLOR UR: ABNORMAL
GLUCOSE UR STRIP-MCNC: NEGATIVE MG/DL
HGB UR QL STRIP.AUTO: ABNORMAL
KETONES UR STRIP-MCNC: NEGATIVE MG/DL
LEUKOCYTE ESTERASE UR QL STRIP: ABNORMAL
MUCOUS THREADS UR QL AUTO: ABNORMAL
NITRITE UR QL STRIP: NEGATIVE
NON-SQ EPI CELLS URNS QL MICRO: ABNORMAL /HPF
PH UR STRIP.AUTO: 6 [PH]
PROT UR STRIP-MCNC: ABNORMAL MG/DL
RBC #/AREA URNS AUTO: ABNORMAL /HPF
SP GR UR STRIP.AUTO: 1.02 (ref 1–1.03)
UROBILINOGEN UR STRIP-ACNC: <2 MG/DL
WBC #/AREA URNS AUTO: ABNORMAL /HPF

## 2023-02-01 RX ORDER — DOXYCYCLINE 100 MG/1
100 TABLET ORAL 2 TIMES DAILY
Qty: 14 TABLET | Refills: 0 | Status: SHIPPED | OUTPATIENT
Start: 2023-02-01 | End: 2023-02-01

## 2023-02-01 RX ORDER — DOXYCYCLINE 100 MG/1
100 TABLET ORAL 2 TIMES DAILY
Qty: 14 TABLET | Refills: 0 | Status: SHIPPED | OUTPATIENT
Start: 2023-02-01 | End: 2023-02-08

## 2023-02-02 DIAGNOSIS — B96.89 BV (BACTERIAL VAGINOSIS): Primary | ICD-10-CM

## 2023-02-02 DIAGNOSIS — N76.0 BV (BACTERIAL VAGINOSIS): Primary | ICD-10-CM

## 2023-02-02 LAB
C GLABRATA DNA VAG QL NAA+PROBE: NEGATIVE
C KRUSEI DNA VAG QL NAA+PROBE: NEGATIVE
CANDIDA SP 6 PNL VAG NAA+PROBE: NEGATIVE
T VAGINALIS DNA VAG QL NAA+PROBE: NEGATIVE
VAGINOSIS/ITIS DNA PNL VAG PROBE+SIG AMP: POSITIVE

## 2023-02-02 RX ORDER — METRONIDAZOLE 7.5 MG/G
1 GEL VAGINAL
Qty: 70 G | Refills: 0 | Status: SHIPPED | OUTPATIENT
Start: 2023-02-02 | End: 2023-02-07

## 2023-02-03 ENCOUNTER — TELEPHONE (OUTPATIENT)
Dept: OBGYN CLINIC | Facility: CLINIC | Age: 18
End: 2023-02-03

## 2023-02-03 LAB
BACTERIA UR CULT: ABNORMAL
BV WHIFF TEST VAG QL: NORMAL
CLUE CELLS SPEC QL WET PREP: NORMAL
PH SMN: 5 [PH]
T VAGINALIS VAG QL WET PREP: NORMAL
YEAST VAG QL WET PREP: NORMAL

## 2023-02-03 NOTE — PROGRESS NOTES
Bacilio Salinas  2005      CC:  Vaginal discharge    S:  21 y o  female here for evaluation  She was last seen in 12/2022 for ER f/u due to pelvic pain  She reported severe RLQ pain  Had UA, CBC, CMP, GC/CT cultures, and UPT, which were negative 11/21/22  Pelvic US at that time showed her IUD is in place, but otherwise unremarkable imaging  Molecular panel, repeat UA C&S were preformed  Molecular panel returned indeterminate and CS showed Group B strep  Seen for F/u 2 weeks later due to persistent PCB and irregular menstrual bleeding  Sureswab vaginitis probe collected which came back positive for Chlamydia  She was treated with doxycycline, metronidazole PO, and diflucan  She began taking the doxycycline only  Was inconsistent with this, completing the full course of therapy over > 1 week - notes both missed pills and days between doses  Her partner was treated with 1g azithromycing - but took in divided doses days apart  They were sexually active multiple times between both completing tx, "he just couldn't wait"  She presents today and is admittedly angry, frustrated, and in pain  She c/o LLQ, thin mucus discharge that is clear to yellow, and vulvar itching  Reports dysuria, frequency, and urgency  She denies odor, vaginal bleeding, nausea, vomiting, flank pain, F/C/S  Current Outpatient Medications:   •  doxycycline (ADOXA) 100 MG tablet, Take 1 tablet (100 mg total) by mouth 2 (two) times a day for 7 days, Disp: 14 tablet, Rfl: 0  •  levonorgestrel (KYLEENA) 19 5 MG intrauterine device, 1 each by Intrauterine route once, Disp: , Rfl:   •  Adapalene-Benzoyl Peroxide 0 1-2 5 % gel, Apply to skin once daily (Patient not taking: Reported on 2/1/2023), Disp: 45 g, Rfl: 3  •  ketoconazole (NIZORAL) 2 % shampoo, Apply 1 application topically once for 1 dose SHMP 2x/wk x 8 wk, then as needed    Leave on 5 min then rinse , Disp: 120 mL, Rfl: 2  •  metroNIDAZOLE (METROGEL) 0 75 % vaginal gel, Insert 1 application into the vagina daily at bedtime for 5 days, Disp: 70 g, Rfl: 0  •  triamcinolone (KENALOG) 0 1 % ointment, Apply twice daily for up to 2 weeks, as needed for irritation  (Patient not taking: Reported on 2/1/2023), Disp: 30 g, Rfl: 0  Social History     Socioeconomic History   • Marital status: Single     Spouse name: Not on file   • Number of children: Not on file   • Years of education: Not on file   • Highest education level: Not on file   Occupational History   • Not on file   Tobacco Use   • Smoking status: Never   • Smokeless tobacco: Never   Vaping Use   • Vaping Use: Never used   Substance and Sexual Activity   • Alcohol use: Never   • Drug use: Never   • Sexual activity: Yes     Partners: Male     Birth control/protection: I U D  Comment: Gela Ayala 10/5/21   Other Topics Concern   • Not on file   Social History Narrative   • Not on file     Social Determinants of Health     Financial Resource Strain: Not on file   Food Insecurity: Not on file   Transportation Needs: Not on file   Physical Activity: Not on file   Stress: Not on file   Social Connections: Not on file   Intimate Partner Violence: Not on file   Housing Stability: Not on file     Family History   Problem Relation Age of Onset   • Migraines Mother    • Depression Father    • Anxiety disorder Father    • Cancer Maternal Grandmother    • Thyroid disease Maternal Grandmother    • Uterine cancer Maternal Grandmother    • No Known Problems Maternal Grandfather    • No Known Problems Paternal Grandmother    • No Known Problems Paternal Grandfather    • Uterine cancer Other    • Uterine cancer Other    • Mental illness Neg Hx    • Substance Abuse Neg Hx      Past Medical History:   Diagnosis Date   • Migraines          O:  Blood pressure 104/74, weight 56 7 kg (125 lb), unknown if currently breastfeeding  Patient appears well  Initially distressed, calm by end of visit  Normal respiratory effort  Abdomen is soft and without masses  External genitals are mildly erythematous without lesions or rashes  Vagina and cervix are mildly erythematous with yellow tinged mucoid discharge  IUD strings in place  Uterus is mobile nontender without palpable mass  Adnexa are without palpable mass  + LLQ pain       A/P    1  Chlamydia    - doxycycline (ADOXA) 100 MG tablet; Take 1 tablet (100 mg total) by mouth 2 (two) times a day for 7 days  Dispense: 14 tablet; Refill: 0      Stressed the importance of effectively treating chlamydia  Discussed risk of PID and long term sequela that can accompany this  Partner contacted during time of visit  Rx for doxycyline sent to his preferred pharmacy  Triage notified to call and reiterate tx recommendations  She refuses repeat GC/CT testing  Notes she knows she has chlamydia and doesn't want repeat for gonorrhea  Given strict precautions that if sx not fully resolved in 1 week she should return for culture  Wet mount unremarkable  Molecular panel sent for confirmation and trichomonas testing  Will notify with these and urine culture results as well  Will notify with results  Advised completion of antibiotic as prescribed  To call or seek evaluation with worsening symptoms  2  Vulvar irritation    - Molecular Vaginal Panel  - POCT wet mount    3  Burning with urination    - Urinalysis with microscopic  - Urine culture      She will call in one week if she is not feeling completely better

## 2023-02-03 NOTE — TELEPHONE ENCOUNTER
----- Message from Wayne Qureshi PA-C sent at 2/2/2023  5:26 PM EST -----  Please let Sativa know her molecular panel returned showing BV  Will send in metrogel for treatment  Please instruct on the same  Please have her call in 2 weeks if symptoms not fully resolved       Thanks,  Liu Good

## 2023-03-24 ENCOUNTER — APPOINTMENT (OUTPATIENT)
Dept: LAB | Facility: CLINIC | Age: 18
End: 2023-03-24

## 2023-03-24 ENCOUNTER — OCCMED (OUTPATIENT)
Dept: URGENT CARE | Facility: CLINIC | Age: 18
End: 2023-03-24

## 2023-03-24 DIAGNOSIS — Z02.1 PRE-EMPLOYMENT HEALTH SCREENING EXAMINATION: ICD-10-CM

## 2023-03-24 DIAGNOSIS — Z02.1 PRE-EMPLOYMENT HEALTH SCREENING EXAMINATION: Primary | ICD-10-CM

## 2023-03-24 LAB — RUBV IGG SERPL IA-ACNC: 21.3 IU/ML

## 2023-03-25 LAB
MEV IGG SER QL IA: NORMAL
MUV IGG SER QL IA: NORMAL
VZV IGG SER QL IA: ABNORMAL

## 2023-03-27 LAB
GAMMA INTERFERON BACKGROUND BLD IA-ACNC: 0.04 IU/ML
M TB IFN-G BLD-IMP: NEGATIVE
M TB IFN-G CD4+ BCKGRND COR BLD-ACNC: 0 IU/ML
M TB IFN-G CD4+ BCKGRND COR BLD-ACNC: 0.02 IU/ML
MITOGEN IGNF BCKGRD COR BLD-ACNC: >10 IU/ML

## 2023-03-29 ENCOUNTER — OFFICE VISIT (OUTPATIENT)
Dept: OBGYN CLINIC | Facility: CLINIC | Age: 18
End: 2023-03-29

## 2023-03-29 VITALS — BODY MASS INDEX: 19.8 KG/M2 | WEIGHT: 119 LBS | SYSTOLIC BLOOD PRESSURE: 108 MMHG | DIASTOLIC BLOOD PRESSURE: 78 MMHG

## 2023-03-29 DIAGNOSIS — Z11.3 SCREENING EXAMINATION FOR STD (SEXUALLY TRANSMITTED DISEASE): ICD-10-CM

## 2023-03-29 DIAGNOSIS — N93.9 VAGINAL BLEEDING: Primary | ICD-10-CM

## 2023-03-29 NOTE — PROGRESS NOTES
Bacilio Salinas  2005      CC: IUD check    S: 21 y o  female with c/o irregular bleeding  Has been ongoing since November  Can go days to a week with no bleeding then starts again  Ranges from spotting to period like flow  Some dysmenorrhea associated with bleeding but no current or persistent pain  Previously amenorrheic on Cece Kins has been present since 10/5/2021  Is concerned noting the bleeding was the first sign that she had an infection  She was seen in the ER 11/2022 for pelvic pain  Had UA, CBC, CMP, GC/CT cultures, and UPT, which were negative  Pelvic US at that time showed her IUD is in place, but otherwise unremarkable imaging  Molecular panel, repeat UA C&S were preformed  Molecular panel returned indeterminate and CS showed Group B strep  Seen for F/u 2 weeks later due to persistent PCB and irregular menstrual bleeding  Sureswab vaginitis probe collected which came back positive for Chlamydia and BV  She was treated with doxycycline, metronidazole PO, and diflucan  She began taking the doxycycline only  Was inconsistent with this, completing the full course of therapy over > 1 week  Her partner was treated with 1g azithromycin - but took in divided doses days apart  They were sexually active multiple times between both completing tx  She and partner were retreated empirically on 2/1/2023 and completed the full course of tx a week later  She would like to be retested today to r/o infection  Wants to know what she can do to stop bleeding as it is annoying  Cannot have IC with bleeding, which is causing tension with her partner  Patient's last menstrual period was 03/27/2023      Current Outpatient Medications:   •  levonorgestrel (KYLEENA) 19 5 MG intrauterine device, 1 each by Intrauterine route once, Disp: , Rfl:   •  Adapalene-Benzoyl Peroxide 0 1-2 5 % gel, Apply to skin once daily (Patient not taking: Reported on 2/1/2023), Disp: 45 g, Rfl: 3  •  ketoconazole (NIZORAL) 2 % shampoo, Apply 1 application topically once for 1 dose SHMP 2x/wk x 8 wk, then as needed  Leave on 5 min then rinse , Disp: 120 mL, Rfl: 2  •  triamcinolone (KENALOG) 0 1 % ointment, Apply twice daily for up to 2 weeks, as needed for irritation  (Patient not taking: Reported on 2/1/2023), Disp: 30 g, Rfl: 0  Social History     Socioeconomic History   • Marital status: Single     Spouse name: Not on file   • Number of children: Not on file   • Years of education: Not on file   • Highest education level: Not on file   Occupational History   • Not on file   Tobacco Use   • Smoking status: Never   • Smokeless tobacco: Never   Vaping Use   • Vaping Use: Never used   Substance and Sexual Activity   • Alcohol use: Never   • Drug use: Never   • Sexual activity: Yes     Partners: Male     Birth control/protection: I U D  Comment: Edgar Awad 10/5/21   Other Topics Concern   • Not on file   Social History Narrative   • Not on file     Social Determinants of Health     Financial Resource Strain: Not on file   Food Insecurity: Not on file   Transportation Needs: Not on file   Physical Activity: Not on file   Stress: Not on file   Social Connections: Not on file   Intimate Partner Violence: Not on file   Housing Stability: Not on file     Family History   Problem Relation Age of Onset   • Migraines Mother    • Depression Father    • Anxiety disorder Father    • Cancer Maternal Grandmother    • Thyroid disease Maternal Grandmother    • Uterine cancer Maternal Grandmother    • No Known Problems Maternal Grandfather    • No Known Problems Paternal Grandmother    • No Known Problems Paternal Grandfather    • Uterine cancer Other    • Uterine cancer Other    • Mental illness Neg Hx    • Substance Abuse Neg Hx      Past Medical History:   Diagnosis Date   • Migraines        Review of Systems   Constitutional: + fatigue  Neg F/C/S  Gastrointestinal: Negative for abd pain, flank pain  Genitourinary: + vaginal bleeding   Negative for urinary sx, pelvic pain,vaginal discharge, itching or odor  O:  Blood pressure 108/78, weight 54 kg (119 lb), last menstrual period 03/27/2023, not currently breastfeeding  She appears well and is in no distress  Normocephalic, atraumatic  Normal respiratory effort   Abdomen is soft and nontender  External genitals are normal without lesions or rashes  Vagina is without discharge  + moderate menstrual blood  Cervix is without lesions or discharge  No CMT  IUD strings in place  Uterus is nontender, no masses  Adnexa are nontender, no pelvic masses appreciated  No focal neurological deficits  Normal mood, affect, and behavior  A/P:    1  Vaginal bleeding    - Chlamydia/GC amplified DNA by PCR  - Molecular Vaginal Panel    Reviewed common causes for BTB on IUD  Bleeding pattern unchanged since her last US 11/21/22, which showed proper placement  Will repeat infection testing  Discussed option for monitoring, removal with reinsertion of Kyleena vs Mirena   Recalls painful insertion previously and declines reinsertion today  Would like to monitor for now and think about her options  To start aleve OR ibuprofen to slow bleeding  Use reviewed  To call office with decision going forward  All questions answered  Precaution given       2  Screening examination for STD (sexually transmitted disease)    - Chlamydia/GC amplified DNA by PCR

## 2023-03-30 LAB
C GLABRATA DNA VAG QL NAA+PROBE: NEGATIVE
C KRUSEI DNA VAG QL NAA+PROBE: NEGATIVE
CANDIDA SP 6 PNL VAG NAA+PROBE: NEGATIVE
T VAGINALIS DNA VAG QL NAA+PROBE: NEGATIVE
VAGINOSIS/ITIS DNA PNL VAG PROBE+SIG AMP: NEGATIVE

## 2023-03-31 LAB
C TRACH DNA SPEC QL NAA+PROBE: NEGATIVE
N GONORRHOEA DNA SPEC QL NAA+PROBE: NEGATIVE

## 2023-05-30 ENCOUNTER — OFFICE VISIT (OUTPATIENT)
Dept: FAMILY MEDICINE CLINIC | Facility: CLINIC | Age: 18
End: 2023-05-30

## 2023-05-30 VITALS
OXYGEN SATURATION: 99 % | SYSTOLIC BLOOD PRESSURE: 102 MMHG | HEART RATE: 78 BPM | HEIGHT: 65 IN | DIASTOLIC BLOOD PRESSURE: 72 MMHG | BODY MASS INDEX: 19.33 KG/M2 | TEMPERATURE: 97.9 F | RESPIRATION RATE: 16 BRPM | WEIGHT: 116 LBS

## 2023-05-30 DIAGNOSIS — R63.4 WEIGHT LOSS: ICD-10-CM

## 2023-05-30 DIAGNOSIS — F33.2 SEVERE EPISODE OF RECURRENT MAJOR DEPRESSIVE DISORDER, WITHOUT PSYCHOTIC FEATURES (HCC): ICD-10-CM

## 2023-05-30 DIAGNOSIS — Z11.59 ENCOUNTER FOR HEPATITIS C SCREENING TEST FOR LOW RISK PATIENT: ICD-10-CM

## 2023-05-30 DIAGNOSIS — R53.83 FATIGUE, UNSPECIFIED TYPE: ICD-10-CM

## 2023-05-30 DIAGNOSIS — G43.909 MIGRAINE WITHOUT STATUS MIGRAINOSUS, NOT INTRACTABLE, UNSPECIFIED MIGRAINE TYPE: ICD-10-CM

## 2023-05-30 DIAGNOSIS — Z00.00 ENCOUNTER FOR ANNUAL PHYSICAL EXAM: Primary | ICD-10-CM

## 2023-05-30 RX ORDER — ESCITALOPRAM OXALATE 5 MG/1
5 TABLET ORAL DAILY
Qty: 30 TABLET | Refills: 1 | Status: SHIPPED | OUTPATIENT
Start: 2023-05-30

## 2023-05-30 NOTE — ASSESSMENT & PLAN NOTE
Diagnosed several years ago  Happening more often ( 3 x a week)  Not on abortive therapy  Given the frequency, recommend ppx but will wait till we follow up in 6 weeks  That way we are not starting 2 medication as the same time   Would consider Elavil as it will also help her sleep

## 2023-05-30 NOTE — ASSESSMENT & PLAN NOTE
Diagnosed several years ago  Sees a therapist  Establishing with psych  No IP psych admissions reported  Check labs  Scored 14 on PHQ9  Will start lexapro 5 mg daily  Aware of the potential side effects  May self titrate to 10 mg in 2 weeks if there is partial response  Follow up in 4-6 weeks   Review labs at the next appt

## 2023-05-30 NOTE — PROGRESS NOTES
[address]    NAME: Bacilio Salinas  AGE: 19 y o  SEX: female  : 2005     DATE: 2023     Assessment and Plan:     Problem List Items Addressed This Visit        Cardiovascular and Mediastinum    Migraines     Diagnosed several years ago  Happening more often ( 3 x a week)  Not on abortive therapy  Given the frequency, recommend ppx but will wait till we follow up in 6 weeks  That way we are not starting 2 medication as the same time  Would consider Elavil as it will also help her sleep         Relevant Medications    escitalopram (LEXAPRO) 5 mg tablet       Other    Severe episode of recurrent major depressive disorder, without psychotic features ([facility] [address] )     Diagnosed several years ago  Sees a therapist  Establishing with psych  No IP psych admissions reported  Check labs  Scored 14 on PHQ9  Will start lexapro 5 mg daily  Aware of the potential side effects  May self titrate to 10 mg in 2 weeks if there is partial response  Follow up in 4-6 weeks  Review labs at the next appt          Relevant Medications    escitalopram (LEXAPRO) 5 mg tablet   Other Visit Diagnoses     Encounter for annual physical exam    -  Primary    New patient with anxiety, depression, and migraines  Not eligible for cervical cancer screening  FBW ordered  Relevant Medications    escitalopram (LEXAPRO) 5 mg tablet    Fatigue, unspecified type        chronic fatigue a/w anxiety and depression  Will look for organic causes     Relevant Orders    CBC and differential    Iron Panel (Includes Ferritin, Iron Sat%, Iron, and TIBC)    TSH, 3rd generation with Free T4 reflex    Vitamin D 25 hydroxy    Encounter for hepatitis C screening test for low risk patient        Consented and ordered     Relevant Orders    Hepatitis C antibody    Weight loss        Unintentional weight loss  Was 130, now 116  Will go days without consuming food  Likely due to anxiety  Lexapro started  Sees a therapist  Will monitor weight    Relevant Orders    TSH, 3rd generation with Free T4 reflex    Comprehensive metabolic panel          Immunizations and preventive care screenings were discussed with patient today  Appropriate education was printed on patient's after visit summary  Counseling:  Exercise: the importance of regular exercise/physical activity was discussed  Recommend exercise 3-5 times per week for at least 30 minutes  No follow-ups on file  Chief Complaint:     Chief Complaint   Patient presents with   • Physical Exam   • New Patient Visit     New patient is being seen for an annual physical        History of Present Illness:     Adult Annual Physical   Patient here as a new patient for a comprehensive physical exam  The patient reports anxiety and depression  Last PCP was Dr Chastity Angela   Diagnosed with anxiety since childhood  Not on medications but would like to start   Seeing a therapist  Started seeing one 3 months ago  She has seen therapist off and on in the past   Has not been able to eat due to her anxiety and lost about 20 lbs ( 130 lbs 2 months ago)  No previous psych admission  History of migraines  Diagnosed in her teens   Was denied sleep study  Tried mg abd riboflavin but was ineffective  Has gotten worse in the last 2 months  Likely related to stress  Occurs 3 x a week      Diet and Physical Activity  Diet/Nutrition: poor diet  Has not been able eat due to anxiety   Goes days without eating  Exercise: eating back to gym and walks     Depression Screening  PHQ-2/9 Depression Screening    Little interest or pleasure in doing things: 3 - nearly every day  Feeling down, depressed, or hopeless: 2 - more than half the days  Trouble falling or staying asleep, or sleeping too much: 3 - nearly every day  Feeling tired or having little energy: 3 - nearly every day  Poor appetite or overeating: 3 - nearly every day  Feeling bad about yourself - or that you are a failure or have let yourself or your family down: 0 - not at all  Trouble concentrating on things, such as reading the newspaper or watching television: 0 - not at all  Moving or speaking so slowly that other people could have noticed  Or the opposite - being so fidgety or restless that you have been moving around a lot more than usual: 0 - not at all  Thoughts that you would be better off dead, or of hurting yourself in some way: 0 - not at all  PHQ-9 Score: 14   PHQ-9 Interpretation: Moderate depression        General Health  Sleep: sleeps poorly  Will get 4 hours of sleep  Suffers with sleep paralysis  Hearing: normal - bilateral   Vision: near sighted but doesn't wear her glasses  Dental: regular dental visits  /GYN Health  Last menstrual period: Spotted on the 3rd but periods was 4/23/23  Contraceptive method: IUD placement  Placed 2 years ago  History of STDs?: yes, chlamydia 4 months ago     Review of Systems:     Review of Systems   Past Medical History:     Past Medical History:   Diagnosis Date   • Migraines       Past Surgical History:     Past Surgical History:   Procedure Laterality Date   • WISDOM TOOTH EXTRACTION        Social History:     Social History     Socioeconomic History   • Marital status: Single     Spouse name: None   • Number of children: None   • Years of education: None   • Highest education level: None   Occupational History   • None   Tobacco Use   • Smoking status: Never     Passive exposure: Never   • Smokeless tobacco: Never   Vaping Use   • Vaping Use: Never used   Substance and Sexual Activity   • Alcohol use: Never   • Drug use: Never   • Sexual activity: Yes     Partners: Male     Birth control/protection: I U D       Comment: Delisa Carrizales 10/5/21   Other Topics Concern   • None   Social History Narrative   • None     Social Determinants of Health     Financial Resource Strain: Not on file   Food Insecurity: Not on file   Transportation Needs: Not on file "  Physical Activity: Not on file   Stress: Not on file   Social Connections: Not on file   Intimate Partner Violence: Not on file   Housing Stability: Not on file      Family History:     Family History   Problem Relation Age of Onset   • Migraines Mother    • Depression Father    • Anxiety disorder Father    • Anxiety disorder Sister    • Depression Sister    • No Known Problems Brother    • No Known Problems Brother    • No Known Problems Brother    • Cancer Maternal Grandmother    • Thyroid disease Maternal Grandmother    • Uterine cancer Maternal Grandmother    • Heart disease Maternal Grandmother    • No Known Problems Maternal Grandfather    • No Known Problems Paternal Grandmother    • No Known Problems Paternal Grandfather    • Uterine cancer Other    • Uterine cancer Other    • Mental illness Neg Hx    • Substance Abuse Neg Hx       Current Medications:     Current Outpatient Medications   Medication Sig Dispense Refill   • escitalopram (LEXAPRO) 5 mg tablet Take 1 tablet (5 mg total) by mouth daily 30 tablet 1   • levonorgestrel (KYLEENA) 19 5 MG intrauterine device 1 each by Intrauterine route once     • ketoconazole (NIZORAL) 2 % shampoo Apply 1 application topically once for 1 dose SHMP 2x/wk x 8 wk, then as needed  Leave on 5 min then rinse  120 mL 2     No current facility-administered medications for this visit  Allergies:     No Known Allergies   Physical Exam:     /72 (BP Location: Left arm, Patient Position: Sitting, Cuff Size: Adult)   Pulse 78   Temp 97 9 °F (36 6 °C) (Tympanic)   Resp 16   Ht 5' 5 24\" (1 657 m)   Wt 52 6 kg (116 lb)   SpO2 99%   BMI 19 16 kg/m²     Physical Exam  Vitals reviewed  Constitutional:       General: She is not in acute distress  Appearance: Normal appearance  Comments: Underweight    HENT:      Head: Normocephalic and atraumatic        Right Ear: Tympanic membrane normal       Left Ear: Tympanic membrane normal       Mouth/Throat:      " Mouth: Mucous membranes are moist       Pharynx: Oropharynx is clear  No posterior oropharyngeal erythema  Tonsils: No tonsillar exudate or tonsillar abscesses  3+ on the right  3+ on the left  Eyes:      Extraocular Movements: Extraocular movements intact  Cardiovascular:      Rate and Rhythm: Normal rate and regular rhythm  Heart sounds: No murmur heard  Pulmonary:      Effort: Pulmonary effort is normal  No respiratory distress  Breath sounds: Normal breath sounds  Abdominal:      General: Abdomen is flat  There is no distension  Palpations: There is no mass  Tenderness: There is no abdominal tenderness  There is no guarding  Hernia: No hernia is present  Musculoskeletal:      Right lower leg: No edema  Left lower leg: No edema  Lymphadenopathy:      Cervical: No cervical adenopathy  Skin:     General: Skin is warm  Neurological:      Mental Status: She is alert and oriented to person, place, and time  Psychiatric:         Mood and Affect: Mood normal          Behavior: Behavior normal          Thought Content:  Thought content normal           Nehemiah Prakash MD   [facility]

## 2023-06-28 ENCOUNTER — OFFICE VISIT (OUTPATIENT)
Dept: FAMILY MEDICINE CLINIC | Facility: CLINIC | Age: 18
End: 2023-06-28
Payer: COMMERCIAL

## 2023-06-28 VITALS
TEMPERATURE: 97.5 F | DIASTOLIC BLOOD PRESSURE: 70 MMHG | SYSTOLIC BLOOD PRESSURE: 102 MMHG | OXYGEN SATURATION: 98 % | HEART RATE: 102 BPM | RESPIRATION RATE: 16 BRPM | WEIGHT: 113.4 LBS | BODY MASS INDEX: 18.73 KG/M2

## 2023-06-28 DIAGNOSIS — R63.4 WEIGHT LOSS: ICD-10-CM

## 2023-06-28 DIAGNOSIS — G43.909 MIGRAINE WITHOUT STATUS MIGRAINOSUS, NOT INTRACTABLE, UNSPECIFIED MIGRAINE TYPE: Primary | ICD-10-CM

## 2023-06-28 DIAGNOSIS — F40.243 FEAR OF FLYING: ICD-10-CM

## 2023-06-28 DIAGNOSIS — F33.2 SEVERE EPISODE OF RECURRENT MAJOR DEPRESSIVE DISORDER, WITHOUT PSYCHOTIC FEATURES (HCC): ICD-10-CM

## 2023-06-28 DIAGNOSIS — R53.83 FATIGUE, UNSPECIFIED TYPE: ICD-10-CM

## 2023-06-28 PROCEDURE — 99214 OFFICE O/P EST MOD 30 MIN: CPT | Performed by: FAMILY MEDICINE

## 2023-06-28 RX ORDER — SUMATRIPTAN 25 MG/1
25 TABLET, FILM COATED ORAL AS NEEDED
Qty: 10 TABLET | Refills: 0 | Status: SHIPPED | OUTPATIENT
Start: 2023-06-28

## 2023-06-28 RX ORDER — ALPRAZOLAM 0.25 MG/1
0.25 TABLET ORAL AS NEEDED
Qty: 3 TABLET | Refills: 0 | Status: SHIPPED | OUTPATIENT
Start: 2023-06-28

## 2023-06-28 NOTE — PROGRESS NOTES
Name: Tessa Dial      : 2005      MRN: [id number]  Encounter Provider: Carlito Blount MD  Encounter Date: 2023   Encounter department: [place] [address]  Migraine without status migrainosus, not intractable, unspecified migraine type  Assessment & Plan:  Continues to have migraines  Last one was last night and lasted several hours  A/w aura  Prefer abortive therapy  Start sumatriptan 25 mg daily as needed  May repeat dose after 2 hours if migraine does not improve  Orders:  -     SUMAtriptan (Imitrex) 25 mg tablet; Take 1 tablet (25 mg total) by mouth if needed for migraine for up to 10 doses May repeat once after 2 hours    2  Weight loss  Comments:  Lost 3 more lbs  Reminded patient to get BW  Likely anxiety but want to r/o thyroid issues or diabetes  Orders:  -     Tissue Transglutaminase, IgG,IgA; Future  -     Endomysial antibody, IgA; Future    3  Fatigue, unspecified type  Comments:  More fatigued with lexapro  Asked to take before bed  Works night shift  Reminded to get BW to r/o deficiencies or anemia  Will also r/o mono   Orders:  -     Vitamin B12; Future  -     EBV acute panel; Future    4  Severe episode of recurrent major depressive disorder, without psychotic features ([facility] [address] )  Assessment & Plan:  Slight improvement with lexapro  Labs were not yet completed to look for organic caused  Does reports passive suicidal thoughts  Tried increasing to 10 mg but felt more tired and anxious  Will try to increase once more  If not, add mirtazapine to help with weight and sleep       5  Fear of flying  Comments:  Flying to [place]  Xanax 0 25 mg ordered and should be take 30 min to hour prior to the flight  Orders:  -     ALPRAZolam (XANAX) 0 25 mg tablet; Take 1 tablet (0 25 mg total) by mouth if needed for anxiety           Subjective      Seen for 4 weeks ago with fatigue, depressive symptoms, weight loss, and migraines  We started lexapro   She sees a therapist  Also ordered labs but was not completed  Presently, states she is still losing weight  Is trying to up her calories but has lost 3 lbs since we last met  Reports abdominal pain and nausea if she eats too much  Last BM was yesterday and has no problems moving her bowels  Migraines are still present  Had one last night that kept her up for hours  Exedrine migraine is not longer helping  Has been taking Mg and riboflavin for a year now  Mood has improved with lexpo 5 mg  Did try to up the dose to 10 mg but the anxiety worsened  She now back to 5 mg daily  Does complain of fatigue  Works night and take the mediation prior to work  Has had passive suicidal thoughts and sadness    Review of Systems   Constitutional: Positive for appetite change, fatigue and unexpected weight change  Eyes: Positive for visual disturbance  Gastrointestinal: Positive for abdominal pain and nausea  Neurological: Positive for headaches  Psychiatric/Behavioral: Positive for sleep disturbance and suicidal ideas (passive)  The patient is nervous/anxious  Current Outpatient Medications on File Prior to Visit   Medication Sig   • escitalopram (LEXAPRO) 5 mg tablet Take 1 tablet (5 mg total) by mouth daily   • levonorgestrel (KYLEENA) 19 5 MG intrauterine device 1 each by Intrauterine route once   • ketoconazole (NIZORAL) 2 % shampoo Apply 1 application topically once for 1 dose SHMP 2x/wk x 8 wk, then as needed  Leave on 5 min then rinse  Objective     /70   Pulse 102   Temp 97 5 °F (36 4 °C)   Resp 16   Wt 51 4 kg (113 lb 6 4 oz)   SpO2 98%   BMI 18 73 kg/m²     Physical Exam  Vitals reviewed  Constitutional:       General: She is not in acute distress  Appearance: She is not ill-appearing or toxic-appearing  Comments: underweight    HENT:      Head: Normocephalic and atraumatic  Eyes:      Extraocular Movements: Extraocular movements intact     Pulmonary:      Effort: Pulmonary effort is normal    Neurological:      Mental Status: She is alert and oriented to person, place, and time  Psychiatric:         Mood and Affect: Mood is depressed  Speech: Speech normal          Behavior: Behavior is slowed  Thought Content:  Thought content normal          Judgment: Judgment normal        Willam Golden MD

## 2023-06-28 NOTE — ASSESSMENT & PLAN NOTE
Slight improvement with lexapro  Labs were not yet completed to look for organic caused  Does reports passive suicidal thoughts  Tried increasing to 10 mg but felt more tired and anxious  Will try to increase once more   If not, add mirtazapine to help with weight and sleep

## 2023-06-28 NOTE — ASSESSMENT & PLAN NOTE
Continues to have migraines  Last one was last night and lasted several hours  A/w aura  Prefer abortive therapy  Start sumatriptan 25 mg daily as needed  May repeat dose after 2 hours if migraine does not improve

## 2023-07-25 DIAGNOSIS — Z00.00 ENCOUNTER FOR ANNUAL PHYSICAL EXAM: ICD-10-CM

## 2023-07-25 RX ORDER — ESCITALOPRAM OXALATE 5 MG/1
5 TABLET ORAL DAILY
Qty: 90 TABLET | Refills: 1 | Status: SHIPPED | OUTPATIENT
Start: 2023-07-25

## 2023-07-28 ENCOUNTER — APPOINTMENT (OUTPATIENT)
Dept: LAB | Facility: CLINIC | Age: 18
End: 2023-07-28
Payer: COMMERCIAL

## 2023-07-28 ENCOUNTER — PATIENT MESSAGE (OUTPATIENT)
Dept: FAMILY MEDICINE CLINIC | Facility: CLINIC | Age: 18
End: 2023-07-28

## 2023-07-28 DIAGNOSIS — R53.83 FATIGUE, UNSPECIFIED TYPE: ICD-10-CM

## 2023-07-28 DIAGNOSIS — R63.4 WEIGHT LOSS: ICD-10-CM

## 2023-07-28 DIAGNOSIS — Z11.59 ENCOUNTER FOR HEPATITIS C SCREENING TEST FOR LOW RISK PATIENT: ICD-10-CM

## 2023-07-28 DIAGNOSIS — G43.909 MIGRAINE WITHOUT STATUS MIGRAINOSUS, NOT INTRACTABLE, UNSPECIFIED MIGRAINE TYPE: Primary | ICD-10-CM

## 2023-07-28 LAB
25(OH)D3 SERPL-MCNC: 27.9 NG/ML (ref 30–100)
ALBUMIN SERPL BCP-MCNC: 4.2 G/DL (ref 3.5–5)
ALP SERPL-CCNC: 67 U/L (ref 46–116)
ALT SERPL W P-5'-P-CCNC: 25 U/L (ref 12–78)
ANION GAP SERPL CALCULATED.3IONS-SCNC: 6 MMOL/L
AST SERPL W P-5'-P-CCNC: 27 U/L (ref 5–45)
BASOPHILS # BLD AUTO: 0.05 THOUSANDS/ÂΜL (ref 0–0.1)
BASOPHILS NFR BLD AUTO: 1 % (ref 0–1)
BILIRUB SERPL-MCNC: 0.64 MG/DL (ref 0.2–1)
BUN SERPL-MCNC: 7 MG/DL (ref 5–25)
CALCIUM SERPL-MCNC: 9.2 MG/DL (ref 8.3–10.1)
CHLORIDE SERPL-SCNC: 108 MMOL/L (ref 96–108)
CO2 SERPL-SCNC: 26 MMOL/L (ref 21–32)
CREAT SERPL-MCNC: 0.78 MG/DL (ref 0.6–1.3)
EOSINOPHIL # BLD AUTO: 0.08 THOUSAND/ÂΜL (ref 0–0.61)
EOSINOPHIL NFR BLD AUTO: 1 % (ref 0–6)
ERYTHROCYTE [DISTWIDTH] IN BLOOD BY AUTOMATED COUNT: 13.5 % (ref 11.6–15.1)
FERRITIN SERPL-MCNC: 18 NG/ML (ref 11–307)
GFR SERPL CREATININE-BSD FRML MDRD: 109 ML/MIN/1.73SQ M
GLUCOSE SERPL-MCNC: 100 MG/DL (ref 65–140)
HCT VFR BLD AUTO: 40.3 % (ref 34.8–46.1)
HGB BLD-MCNC: 13.4 G/DL (ref 11.5–15.4)
IMM GRANULOCYTES # BLD AUTO: 0.02 THOUSAND/UL (ref 0–0.2)
IMM GRANULOCYTES NFR BLD AUTO: 0 % (ref 0–2)
IRON SATN MFR SERPL: 26 % (ref 15–50)
IRON SERPL-MCNC: 92 UG/DL (ref 50–170)
LYMPHOCYTES # BLD AUTO: 1.46 THOUSANDS/ÂΜL (ref 0.6–4.47)
LYMPHOCYTES NFR BLD AUTO: 19 % (ref 14–44)
MCH RBC QN AUTO: 28.9 PG (ref 26.8–34.3)
MCHC RBC AUTO-ENTMCNC: 33.3 G/DL (ref 31.4–37.4)
MCV RBC AUTO: 87 FL (ref 82–98)
MONOCYTES # BLD AUTO: 0.65 THOUSAND/ÂΜL (ref 0.17–1.22)
MONOCYTES NFR BLD AUTO: 9 % (ref 4–12)
NEUTROPHILS # BLD AUTO: 5.3 THOUSANDS/ÂΜL (ref 1.85–7.62)
NEUTS SEG NFR BLD AUTO: 70 % (ref 43–75)
NRBC BLD AUTO-RTO: 0 /100 WBCS
PLATELET # BLD AUTO: 322 THOUSANDS/UL (ref 149–390)
PMV BLD AUTO: 11.8 FL (ref 8.9–12.7)
POTASSIUM SERPL-SCNC: 3.4 MMOL/L (ref 3.5–5.3)
PROT SERPL-MCNC: 7.9 G/DL (ref 6.4–8.4)
RBC # BLD AUTO: 4.63 MILLION/UL (ref 3.81–5.12)
SODIUM SERPL-SCNC: 140 MMOL/L (ref 135–147)
TIBC SERPL-MCNC: 352 UG/DL (ref 250–450)
TSH SERPL DL<=0.05 MIU/L-ACNC: 1.33 UIU/ML (ref 0.45–4.5)
VIT B12 SERPL-MCNC: 368 PG/ML (ref 180–914)
WBC # BLD AUTO: 7.56 THOUSAND/UL (ref 4.31–10.16)

## 2023-07-28 PROCEDURE — 84443 ASSAY THYROID STIM HORMONE: CPT

## 2023-07-28 PROCEDURE — 36415 COLL VENOUS BLD VENIPUNCTURE: CPT

## 2023-07-28 PROCEDURE — 86803 HEPATITIS C AB TEST: CPT

## 2023-07-28 PROCEDURE — 82728 ASSAY OF FERRITIN: CPT

## 2023-07-28 PROCEDURE — 80053 COMPREHEN METABOLIC PANEL: CPT

## 2023-07-28 PROCEDURE — 83550 IRON BINDING TEST: CPT

## 2023-07-28 PROCEDURE — 86663 EPSTEIN-BARR ANTIBODY: CPT

## 2023-07-28 PROCEDURE — 85025 COMPLETE CBC W/AUTO DIFF WBC: CPT

## 2023-07-28 PROCEDURE — 82607 VITAMIN B-12: CPT

## 2023-07-28 PROCEDURE — 86665 EPSTEIN-BARR CAPSID VCA: CPT

## 2023-07-28 PROCEDURE — 83540 ASSAY OF IRON: CPT

## 2023-07-28 PROCEDURE — 86664 EPSTEIN-BARR NUCLEAR ANTIGEN: CPT

## 2023-07-28 PROCEDURE — 82306 VITAMIN D 25 HYDROXY: CPT

## 2023-07-28 PROCEDURE — 86231 EMA EACH IG CLASS: CPT

## 2023-07-28 PROCEDURE — 83516 IMMUNOASSAY NONANTIBODY: CPT

## 2023-07-29 LAB
EBV NA IGG SER IA-ACNC: 68.9 U/ML (ref 0–17.9)
EBV VCA IGG SER IA-ACNC: 441 U/ML (ref 0–17.9)
EBV VCA IGM SER IA-ACNC: <36 U/ML (ref 0–35.9)
ENDOMYSIUM IGA SER QL: NEGATIVE
HCV AB SER QL: NORMAL
INTERPRETATION: ABNORMAL
TTG IGA SER-ACNC: <2 U/ML (ref 0–3)
TTG IGG SER-ACNC: 5 U/ML (ref 0–5)

## 2023-07-30 DIAGNOSIS — G43.909 MIGRAINE WITHOUT STATUS MIGRAINOSUS, NOT INTRACTABLE, UNSPECIFIED MIGRAINE TYPE: ICD-10-CM

## 2023-07-30 RX ORDER — SUMATRIPTAN 25 MG/1
25 TABLET, FILM COATED ORAL AS NEEDED
Qty: 10 TABLET | Refills: 0 | Status: SHIPPED | OUTPATIENT
Start: 2023-07-30 | End: 2023-08-01

## 2023-08-01 ENCOUNTER — PATIENT MESSAGE (OUTPATIENT)
Dept: FAMILY MEDICINE CLINIC | Facility: CLINIC | Age: 18
End: 2023-08-01

## 2023-08-01 DIAGNOSIS — G43.909 MIGRAINE WITHOUT STATUS MIGRAINOSUS, NOT INTRACTABLE, UNSPECIFIED MIGRAINE TYPE: Primary | ICD-10-CM

## 2023-08-01 RX ORDER — RIZATRIPTAN BENZOATE 10 MG/1
10 TABLET ORAL ONCE AS NEEDED
Qty: 10 TABLET | Refills: 0 | Status: SHIPPED | OUTPATIENT
Start: 2023-08-01

## 2023-08-03 RX ORDER — PROPRANOLOL HYDROCHLORIDE 10 MG/1
10 TABLET ORAL 2 TIMES DAILY
Qty: 60 TABLET | Refills: 0 | Status: SHIPPED | OUTPATIENT
Start: 2023-08-03

## 2023-08-25 ENCOUNTER — PATIENT MESSAGE (OUTPATIENT)
Dept: FAMILY MEDICINE CLINIC | Facility: CLINIC | Age: 18
End: 2023-08-25

## 2023-09-20 ENCOUNTER — OFFICE VISIT (OUTPATIENT)
Dept: OBGYN CLINIC | Facility: CLINIC | Age: 18
End: 2023-09-20

## 2023-09-20 VITALS — DIASTOLIC BLOOD PRESSURE: 64 MMHG | SYSTOLIC BLOOD PRESSURE: 110 MMHG | BODY MASS INDEX: 18.67 KG/M2 | WEIGHT: 113 LBS

## 2023-09-20 DIAGNOSIS — Z11.3 SCREENING EXAMINATION FOR STD (SEXUALLY TRANSMITTED DISEASE): Primary | ICD-10-CM

## 2023-09-20 DIAGNOSIS — N76.0 BV (BACTERIAL VAGINOSIS): ICD-10-CM

## 2023-09-20 DIAGNOSIS — N89.8 VAGINAL IRRITATION: ICD-10-CM

## 2023-09-20 DIAGNOSIS — B96.89 BV (BACTERIAL VAGINOSIS): ICD-10-CM

## 2023-09-20 DIAGNOSIS — R30.0 BURNING WITH URINATION: ICD-10-CM

## 2023-09-20 DIAGNOSIS — G43.909 MIGRAINE WITHOUT STATUS MIGRAINOSUS, NOT INTRACTABLE, UNSPECIFIED MIGRAINE TYPE: ICD-10-CM

## 2023-09-20 DIAGNOSIS — B37.31 VULVOVAGINAL CANDIDIASIS: ICD-10-CM

## 2023-09-20 LAB
BACTERIA UR QL AUTO: ABNORMAL /HPF
BILIRUB UR QL STRIP: NEGATIVE
CLARITY UR: CLEAR
CLUE CELLS SPEC QL WET PREP: POSITIVE
COLOR UR: COLORLESS
GLUCOSE UR STRIP-MCNC: NEGATIVE MG/DL
HGB UR QL STRIP.AUTO: NEGATIVE
KETONES UR STRIP-MCNC: NEGATIVE MG/DL
LEUKOCYTE ESTERASE UR QL STRIP: ABNORMAL
NITRITE UR QL STRIP: NEGATIVE
NON-SQ EPI CELLS URNS QL MICRO: ABNORMAL /HPF
PH UR STRIP.AUTO: 6.5 [PH]
PROT UR STRIP-MCNC: NEGATIVE MG/DL
RBC #/AREA URNS AUTO: ABNORMAL /HPF
SP GR UR STRIP.AUTO: 1 (ref 1–1.03)
T VAGINALIS VAG QL WET PREP: NEGATIVE
UROBILINOGEN UR STRIP-ACNC: <2 MG/DL
WBC #/AREA URNS AUTO: ABNORMAL /HPF
YEAST VAG QL WET PREP: POSITIVE

## 2023-09-20 PROCEDURE — 87147 CULTURE TYPE IMMUNOLOGIC: CPT | Performed by: PHYSICIAN ASSISTANT

## 2023-09-20 PROCEDURE — 81001 URINALYSIS AUTO W/SCOPE: CPT | Performed by: PHYSICIAN ASSISTANT

## 2023-09-20 PROCEDURE — 87086 URINE CULTURE/COLONY COUNT: CPT | Performed by: PHYSICIAN ASSISTANT

## 2023-09-20 PROCEDURE — 87491 CHLMYD TRACH DNA AMP PROBE: CPT | Performed by: PHYSICIAN ASSISTANT

## 2023-09-20 PROCEDURE — 87591 N.GONORRHOEAE DNA AMP PROB: CPT | Performed by: PHYSICIAN ASSISTANT

## 2023-09-20 RX ORDER — METRONIDAZOLE 500 MG/1
500 TABLET ORAL 2 TIMES DAILY
Qty: 14 TABLET | Refills: 0 | Status: SHIPPED | OUTPATIENT
Start: 2023-09-20 | End: 2023-09-27

## 2023-09-20 RX ORDER — FLUCONAZOLE 150 MG/1
150 TABLET ORAL
Qty: 2 TABLET | Refills: 0 | Status: SHIPPED | OUTPATIENT
Start: 2023-09-20 | End: 2023-09-24

## 2023-09-20 RX ORDER — NYSTATIN AND TRIAMCINOLONE ACETONIDE 100000; 1 [USP'U]/G; MG/G
OINTMENT TOPICAL 2 TIMES DAILY
Qty: 15 G | Refills: 0 | Status: SHIPPED | OUTPATIENT
Start: 2023-09-20

## 2023-09-20 NOTE — PROGRESS NOTES
Pt here for vaginal irritation   Discharge color is milky pink, yellow  There is a change in odor - fishy   Pt is experiencing itching or burning  Lower abdominal pressure and pain during urination   Sexually - one partner - pain during sex

## 2023-09-21 DIAGNOSIS — Z00.00 ENCOUNTER FOR ANNUAL PHYSICAL EXAM: ICD-10-CM

## 2023-09-21 RX ORDER — ESCITALOPRAM OXALATE 10 MG/1
10 TABLET ORAL DAILY
Qty: 30 TABLET | Refills: 1 | Status: SHIPPED | OUTPATIENT
Start: 2023-09-21

## 2023-09-21 RX ORDER — PROPRANOLOL HYDROCHLORIDE 10 MG/1
10 TABLET ORAL 2 TIMES DAILY
Qty: 60 TABLET | Refills: 0 | Status: SHIPPED | OUTPATIENT
Start: 2023-09-21

## 2023-09-21 NOTE — TELEPHONE ENCOUNTER
Other reason request has been forwarded to provider:   Medication failed protocol.  Please forward to your office staff for further review as this medication was reviewed by the Medication Management Team.  Pulse 102 at visit on 6/28/23

## 2023-09-21 NOTE — PROGRESS NOTES
Assessment/Plan:    1. Burning with urination  - Given abdominal pressure as well; urine studies were sent out  - POCT wet mount  - Chlamydia/GC amplified DNA by PCR  - Urine culture  - Urinalysis with microscopic    2. BV (bacterial vaginosis)  - clue cells on wet mount; rx for flagyl sent  - POCT wet mount  - Chlamydia/GC amplified DNA by PCR  - Urine culture  - Urinalysis with microscopic  - metroNIDAZOLE (FLAGYL) 500 mg tablet; Take 1 tablet (500 mg total) by mouth 2 (two) times a day for 7 days  Dispense: 14 tablet; Refill: 0    3. Vaginal irritation  - POCT wet mount  - Chlamydia/GC amplified DNA by PCR  - Urine culture  - Urinalysis with microscopic    4. Screening examination for STD (sexually transmitted disease)  - Chlamydia/GC amplified DNA by PCR    5. Vulvovaginal candidiasis  -few hyphae on wet mount but rash c/w yeast; rx for diflucan and mycolog sent  - fluconazole (DIFLUCAN) 150 mg tablet; Take 1 tablet (150 mg total) by mouth every third day for 2 doses  Dispense: 2 tablet; Refill: 0  - nystatin-triamcinolone (MYCOLOG-II) ointment; Apply topically 2 (two) times a day  Dispense: 15 g; Refill: 0    Subjective:      Patient ID: Deidra Miller is a 19 y.o. female. Patient presents to the office today for a problem visit. She reports a 3-week history of vaginal irritation. She also reports a 2-week history abdominal pressure with urination as well as discomfort with intercourse. Discharge color varies from milky to pink-tinged to yellow. She reports a fishy odor in her discharge. She also reports experiencing intense external itching and burning as well as internal vaginal itching present for about 3 weeks. She denies change in partner but does except STD screening today. Denies change in household products or detergents or medications. She does shave and wax. She is using [place] IUD for birth control.   This was placed on 10/5/2021    She denies fever, chills, dysuria, urgency, frequency, back pain. The following portions of the patient's history were reviewed and updated as appropriate: allergies, current medications, past family history, past medical history, past social history, past surgical history and problem list.    Review of Systems      Objective:      /64 (BP Location: Left arm, Patient Position: Sitting, Cuff Size: Standard)   Wt 51.3 kg (113 lb)   LMP 08/26/2023 (Exact Date)   BMI 18.67 kg/m²          Physical Exam  Vitals reviewed. Constitutional:       Appearance: Normal appearance. HENT:      Head: Normocephalic and atraumatic. Pulmonary:      Effort: Pulmonary effort is normal.   Abdominal:      General: Abdomen is flat. There is no distension. Palpations: There is no mass. Tenderness: There is no abdominal tenderness. Genitourinary:     Vagina: Vaginal discharge (watery white discharge noted) present. Cervix: No cervical motion tenderness, discharge, lesion or cervical bleeding. Uterus: Not enlarged and not tender. Adnexa:         Right: No mass, tenderness or fullness. Left: No mass, tenderness or fullness. Comments: Vulva with diffuse bright red, erythematous flat rash with satellite lesions c/w probable yeast  +IUD strings  Neurological:      Mental Status: She is alert.

## 2023-09-22 LAB
BACTERIA UR CULT: ABNORMAL
BACTERIA UR CULT: ABNORMAL
C TRACH DNA SPEC QL NAA+PROBE: NEGATIVE
N GONORRHOEA DNA SPEC QL NAA+PROBE: NEGATIVE

## 2024-02-21 PROBLEM — N30.00 ACUTE CYSTITIS WITHOUT HEMATURIA: Status: RESOLVED | Noted: 2019-03-27 | Resolved: 2024-02-21

## 2024-02-21 PROBLEM — Z01.419 ENCOUNTER FOR GYNECOLOGICAL EXAMINATION (GENERAL) (ROUTINE) WITHOUT ABNORMAL FINDINGS: Status: RESOLVED | Noted: 2020-07-08 | Resolved: 2024-02-21

## 2024-07-24 ENCOUNTER — OFFICE VISIT (OUTPATIENT)
Dept: FAMILY MEDICINE CLINIC | Facility: CLINIC | Age: 19
End: 2024-07-24
Payer: COMMERCIAL

## 2024-07-24 VITALS
HEART RATE: 88 BPM | BODY MASS INDEX: 19.79 KG/M2 | OXYGEN SATURATION: 97 % | DIASTOLIC BLOOD PRESSURE: 68 MMHG | HEIGHT: 65 IN | SYSTOLIC BLOOD PRESSURE: 104 MMHG | TEMPERATURE: 98.6 F | RESPIRATION RATE: 16 BRPM | WEIGHT: 118.8 LBS

## 2024-07-24 DIAGNOSIS — Z00.00 ENCOUNTER FOR ANNUAL PHYSICAL EXAM: Primary | ICD-10-CM

## 2024-07-24 DIAGNOSIS — F51.04 PSYCHOPHYSIOLOGICAL INSOMNIA: ICD-10-CM

## 2024-07-24 DIAGNOSIS — G43.719 INTRACTABLE CHRONIC MIGRAINE WITHOUT AURA AND WITHOUT STATUS MIGRAINOSUS: ICD-10-CM

## 2024-07-24 DIAGNOSIS — F41.1 GENERALIZED ANXIETY DISORDER: ICD-10-CM

## 2024-07-24 DIAGNOSIS — E55.9 VITAMIN D INSUFFICIENCY: ICD-10-CM

## 2024-07-24 DIAGNOSIS — F33.2 SEVERE EPISODE OF RECURRENT MAJOR DEPRESSIVE DISORDER, WITHOUT PSYCHOTIC FEATURES (HCC): ICD-10-CM

## 2024-07-24 DIAGNOSIS — Z01.84 IMMUNITY STATUS TESTING: ICD-10-CM

## 2024-07-24 DIAGNOSIS — Z11.1 SCREENING-PULMONARY TB: ICD-10-CM

## 2024-07-24 DIAGNOSIS — D50.8 IRON DEFICIENCY ANEMIA SECONDARY TO INADEQUATE DIETARY IRON INTAKE: ICD-10-CM

## 2024-07-24 PROCEDURE — 3725F SCREEN DEPRESSION PERFORMED: CPT | Performed by: FAMILY MEDICINE

## 2024-07-24 PROCEDURE — 99214 OFFICE O/P EST MOD 30 MIN: CPT | Performed by: FAMILY MEDICINE

## 2024-07-24 PROCEDURE — 99395 PREV VISIT EST AGE 18-39: CPT | Performed by: FAMILY MEDICINE

## 2024-07-24 RX ORDER — AMITRIPTYLINE HYDROCHLORIDE 25 MG/1
25 TABLET, FILM COATED ORAL
Qty: 30 TABLET | Refills: 0 | Status: SHIPPED | OUTPATIENT
Start: 2024-07-24 | End: 2024-07-25 | Stop reason: SDUPTHER

## 2024-07-24 NOTE — PROGRESS NOTES
Adult Annual Physical  Name: Bacilio Salinas      : 2005      MRN: [id number]  Encounter Provider: Airam Rosa MD  Encounter Date: 2024   Encounter department: [facility]    Annual physical completed. We also addressed chronic headaches and depression. Previously on Lexapro and propanolol which were ineffective. Given the headaches and insomnia, suggest a TCA. Start Amitriptyline 25 mg qhs for now and may increase to 50 mg in 2 weeks. Ubrelvy ordered for abortive therapy. Also referred to neurology. Employee health screening ordered along with F/u labs for previously established vitamin deficiencies.    Assessment & Plan   1. Encounter for annual physical exam  2. Psychophysiological insomnia  3. Generalized anxiety disorder  4. Intractable chronic migraine without aura and without status migrainosus  -     Ambulatory Referral to Neurology; Future  5. Screening-pulmonary TB  6. Immunity status testing  -     Measles/Mumps/Rubella Immunity; Future  -     Hepatitis B surface antibody; Future  -     Varicella zoster antibody, IgG; Future  -     Quantiferon TB Gold Plus Assay; Future  7. Severe episode of recurrent major depressive disorder, without psychotic features (HCC)  -     Vitamin D 25 hydroxy; Future  -     Vitamin B12; Future  8. Iron deficiency anemia secondary to inadequate dietary iron intake  -     Iron Panel (Includes Ferritin, Iron Sat%, Iron, and TIBC); Future  9. Vitamin D insufficiency  -     Vitamin D 25 hydroxy; Future    Immunizations and preventive care screenings were discussed with patient today. Appropriate education was printed on patient's after visit summary.    Counseling:  Dental Health: discussed importance of regular tooth brushing, flossing, and dental visits.  Sexual health: discussed sexually transmitted diseases, partner selection, use of condoms, avoidance of unintended pregnancy, and contraceptive alternatives.  Exercise: the importance of  regular exercise/physical activity was discussed. Recommend exercise 3-5 times per week for at least 30 minutes.       Depression Screening and Follow-up Plan: Patient's depression screening was positive with a PHQ-9 score of 18. Patient assessed for underlying major depression. Brief counseling provided and recommend additional follow-up/re-evaluation next office visit.         History of Present Illness     Adult Annual Physical:  Patient presents for annual physical. 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     Diet and Physical Activity:  - Diet/Nutrition:. Difficulty gaining weight. meal planning. Consuming more protein  - Exercise: 1-2 times a week on average. Weight training    Depression Screening:    - PHQ-9 Score: 18    General Health:  - Sleep: 4-6 hours of sleep on average and sleeps poorly.  - Hearing: normal hearing bilateral ears.  - Vision: wears glasses. Suppose to where glasses but doesnt. Has an upcoming appt  - Dental: no dental visits for > 1 year. Fear of dentist but does have an appt in Aug    Review of Systems   Constitutional:  Positive for fatigue.   Eyes:  Negative for visual disturbance.   Neurological:  Positive for headaches.   Psychiatric/Behavioral:  Positive for confusion, decreased concentration, dysphoric mood and sleep disturbance. Negative for agitation and hallucinations. The patient is nervous/anxious.      Past Medical History   Past Medical History:   Diagnosis Date    Migraines      Past Surgical History:   Procedure Laterality Date    WISDOM TOOTH  EXTRACTION       Family History   Problem Relation Age of Onset    Migraines Mother     Depression Father     Anxiety disorder Father     Anxiety disorder Sister     Depression Sister     No Known Problems Brother     No Known Problems Brother     No Known Problems Brother     Cancer Maternal Grandmother     Thyroid disease Maternal Grandmother     Uterine cancer Maternal Grandmother     Heart disease Maternal Grandmother     No Known Problems Maternal Grandfather     No Known Problems Paternal Grandmother     No Known Problems Paternal Grandfather     Uterine cancer Other     Uterine cancer Other     Mental illness Neg Hx     Substance Abuse Neg Hx      Current Outpatient Medications on File Prior to Visit   Medication Sig Dispense Refill    levonorgestrel (KYLEENA) 19.5 MG intrauterine device 1 each by Intrauterine route once      ALPRAZolam (XANAX) 0.25 mg tablet Take 1 tablet (0.25 mg total) by mouth if needed for anxiety (Patient not taking: Reported on 7/24/2024) 3 tablet 0    ketoconazole (NIZORAL) 2 % shampoo Apply 1 application topically once for 1 dose SHMP 2x/wk x 8 wk, then as needed.  Leave on 5 min then rinse. 120 mL 2    nystatin-triamcinolone (MYCOLOG-II) ointment Apply topically 2 (two) times a day (Patient not taking: Reported on 7/24/2024) 15 g 0    propranolol (INDERAL) 10 mg tablet TAKE 1 TABLET BY MOUTH TWICE A DAY (Patient not taking: Reported on 7/24/2024) 180 tablet 1     No current facility-administered medications on file prior to visit.   No Known Allergies   Current Outpatient Medications on File Prior to Visit   Medication Sig Dispense Refill    levonorgestrel (KYLEENA) 19.5 MG intrauterine device 1 each by Intrauterine route once      ALPRAZolam (XANAX) 0.25 mg tablet Take 1 tablet (0.25 mg total) by mouth if needed for anxiety (Patient not taking: Reported on 7/24/2024) 3 tablet 0    ketoconazole (NIZORAL) 2 % shampoo Apply 1 application topically once for 1 dose SHMP 2x/wk x 8 wk,  "then as needed.  Leave on 5 min then rinse. 120 mL 2    nystatin-triamcinolone (MYCOLOG-II) ointment Apply topically 2 (two) times a day (Patient not taking: Reported on 7/24/2024) 15 g 0    propranolol (INDERAL) 10 mg tablet TAKE 1 TABLET BY MOUTH TWICE A DAY (Patient not taking: Reported on 7/24/2024) 180 tablet 1     No current facility-administered medications on file prior to visit.      Social History     Tobacco Use    Smoking status: Never     Passive exposure: Never    Smokeless tobacco: Never   Vaping Use    Vaping status: Never Used   Substance and Sexual Activity    Alcohol use: Never    Drug use: Never    Sexual activity: Yes     Partners: Male     Birth control/protection: I.U.D.     Comment: Alex 10/5/21       Objective     /68 (BP Location: Left arm, Patient Position: Sitting, Cuff Size: Adult)   Pulse 88   Temp 98.6 °F (37 °C) (Tympanic)   Resp 16   Ht 5' 5\" (1.651 m)   Wt 53.9 kg (118 lb 12.8 oz)   SpO2 97%   BMI 19.77 kg/m²     Physical Exam  Constitutional:       General: She is not in acute distress.     Appearance: Normal appearance. She is not ill-appearing or toxic-appearing.   HENT:      Head: Normocephalic and atraumatic.   Cardiovascular:      Rate and Rhythm: Normal rate and regular rhythm.      Heart sounds: No murmur heard.  Pulmonary:      Effort: Pulmonary effort is normal. No respiratory distress.      Breath sounds: Normal breath sounds. No stridor. No wheezing, rhonchi or rales.   Abdominal:      General: There is no distension.      Palpations: Abdomen is soft. There is no mass.      Tenderness: There is no abdominal tenderness. There is no guarding or rebound.      Hernia: No hernia is present.   Neurological:      Mental Status: She is alert and oriented to person, place, and time.   Psychiatric:         Mood and Affect: Mood normal.         Behavior: Behavior normal.         Thought Content: Thought content normal.     Administrative Statements   I have spent a " total time of 35 minutes in caring for this patient on the day of the visit/encounter including Diagnostic results, Risks and benefits of tx options, Instructions for management, Patient and family education, Impressions, Counseling / Coordination of care, Documenting in the medical record, Reviewing / ordering tests, medicine, procedures  , and Obtaining or reviewing history  .

## 2024-07-25 DIAGNOSIS — F41.1 GENERALIZED ANXIETY DISORDER: ICD-10-CM

## 2024-07-25 DIAGNOSIS — F51.04 PSYCHOPHYSIOLOGICAL INSOMNIA: ICD-10-CM

## 2024-07-25 DIAGNOSIS — G43.719 INTRACTABLE CHRONIC MIGRAINE WITHOUT AURA AND WITHOUT STATUS MIGRAINOSUS: ICD-10-CM

## 2024-07-25 RX ORDER — AMITRIPTYLINE HYDROCHLORIDE 25 MG/1
25 TABLET, FILM COATED ORAL
Qty: 30 TABLET | Refills: 0 | Status: SHIPPED | OUTPATIENT
Start: 2024-07-25

## 2024-07-26 ENCOUNTER — TELEPHONE (OUTPATIENT)
Dept: FAMILY MEDICINE CLINIC | Facility: CLINIC | Age: 19
End: 2024-07-26

## 2024-07-28 NOTE — TELEPHONE ENCOUNTER
PA for Ubrelvy Approved     Date(s) approved [id number]    Case #not available    Patient advised by          [x] MyChart Message  [] Phone call   []LMOM  []L/M to call office as no active Communication consent on file  []Unable to leave detailed message as VM not approved on Communication consent       Pharmacy advised by    []Fax  [x]Phone call    Approval letter scanned into Media No letter to follow

## 2024-07-28 NOTE — TELEPHONE ENCOUNTER
PA for Ubrelvy 50mg     Submitted via    [x]CMM-KEY [id number]  []Surescripts-Case ID #   []Faxed to plan   []Other website   []Phone call Case ID #     Office notes sent, clinical questions answered. Awaiting determination    Turnaround time for your insurance to make a decision on your Prior Authorization can take 7-21 business days.

## 2024-07-30 ENCOUNTER — APPOINTMENT (OUTPATIENT)
Dept: LAB | Facility: CLINIC | Age: 19
End: 2024-07-30
Payer: COMMERCIAL

## 2024-07-30 DIAGNOSIS — Z01.84 IMMUNITY STATUS TESTING: ICD-10-CM

## 2024-07-30 DIAGNOSIS — E55.9 VITAMIN D INSUFFICIENCY: ICD-10-CM

## 2024-07-30 DIAGNOSIS — F33.2 SEVERE EPISODE OF RECURRENT MAJOR DEPRESSIVE DISORDER, WITHOUT PSYCHOTIC FEATURES (HCC): ICD-10-CM

## 2024-07-30 DIAGNOSIS — D50.8 IRON DEFICIENCY ANEMIA SECONDARY TO INADEQUATE DIETARY IRON INTAKE: ICD-10-CM

## 2024-07-30 LAB
25(OH)D3 SERPL-MCNC: 38.3 NG/ML (ref 30–100)
FERRITIN SERPL-MCNC: 12 NG/ML (ref 11–307)
HBV SURFACE AB SER-ACNC: 4.03 MIU/ML
IRON SATN MFR SERPL: 22 % (ref 15–50)
IRON SERPL-MCNC: 80 UG/DL (ref 50–212)
TIBC SERPL-MCNC: 371 UG/DL (ref 250–450)
UIBC SERPL-MCNC: 291 UG/DL (ref 155–355)
VIT B12 SERPL-MCNC: 330 PG/ML (ref 180–914)
VZV IGG SER QL IA: ABNORMAL

## 2024-07-30 PROCEDURE — 82728 ASSAY OF FERRITIN: CPT

## 2024-07-30 PROCEDURE — 83550 IRON BINDING TEST: CPT

## 2024-07-30 PROCEDURE — 36415 COLL VENOUS BLD VENIPUNCTURE: CPT

## 2024-07-30 PROCEDURE — 86735 MUMPS ANTIBODY: CPT

## 2024-07-30 PROCEDURE — 82607 VITAMIN B-12: CPT

## 2024-07-30 PROCEDURE — 86480 TB TEST CELL IMMUN MEASURE: CPT

## 2024-07-30 PROCEDURE — 82306 VITAMIN D 25 HYDROXY: CPT

## 2024-07-30 PROCEDURE — 86765 RUBEOLA ANTIBODY: CPT

## 2024-07-30 PROCEDURE — 86706 HEP B SURFACE ANTIBODY: CPT

## 2024-07-30 PROCEDURE — 86762 RUBELLA ANTIBODY: CPT

## 2024-07-30 PROCEDURE — 86787 VARICELLA-ZOSTER ANTIBODY: CPT

## 2024-07-30 PROCEDURE — 83540 ASSAY OF IRON: CPT

## 2024-07-31 LAB
GAMMA INTERFERON BACKGROUND BLD IA-ACNC: 0.02 IU/ML
M TB IFN-G BLD-IMP: NEGATIVE
M TB IFN-G CD4+ BCKGRND COR BLD-ACNC: 0.01 IU/ML
M TB IFN-G CD4+ BCKGRND COR BLD-ACNC: 0.01 IU/ML
MEV IGG SER IA-ACNC: 99.7 AU/ML
MITOGEN IGNF BCKGRD COR BLD-ACNC: 9.98 IU/ML
MUV IGG SER IA-ACNC: 30 AU/ML
RUBV IGG SERPL IA-ACNC: 1.18 INDEX

## 2024-08-18 NOTE — PROGRESS NOTES
Ambulatory Visit  Name: Bacilio Salinas      : 2005      MRN: [id number]  Encounter Provider: Airam Rosa MD  Encounter Date: 2024   Encounter department: [facility]    Assessment & Plan   1. Intractable chronic migraine without aura and without status migrainosus  Comments:  Headaches have improved with elavil 25 mg whs and ubrelvy 50 mg daily. Will continue this dose for now  2. Generalized anxiety disorder  Comments:  Improved with elavil. B12 and iron low. Asked to replete both (500 mcg and 325 mg respectively)  3. Psychophysiological insomnia  Comments:  Improved with elavil 25 mg daily. Will continue current dose for now  4. Encounter for immunization  Comments:  Non immune to VZV, MMR, and Hep B based on employee immunity testing. VZV and Hep B booster administered. Recheck titers in 4 weeks.  Orders:  -     VARICELLA VACCINE IM/SQ  -     Varicella zoster antibody, IgG; Future  -     HEPATITIS B VACCINE ADULT IM  5. Iron deficiency anemia secondary to inadequate dietary iron intake  Comments:  Ferritin 12 last month. Continue iron supplements  6. UTI symptoms  Comments:  Urinary frequency and burning x several days. Started taking OTC pyridium and methanimine. Symptoms have improve but would like to confirm.  collected  Orders:  -     Urine culture       History of Present Illness     Here to review labs as well as follow up headaches, insomnia, and KEYA  States her symptoms have improved   Less tired and sleeping better   Headaches have improved and only had to use ubrelvy once  Less anxious but is currently on her period and may be experiencing PMS symptoms  Developed UTI symptoms several days ago and started taking OTC pyridium and methenamine   Symptoms are improving   Denies fevers, N/V, or flank pain           Review of Systems   Constitutional:  Negative for fever.   Gastrointestinal:  Negative for abdominal pain, nausea and vomiting.   Genitourinary:  Positive  for dysuria, frequency and hematuria (currently mensturating). Negative for difficulty urinating and flank pain.   Neurological:  Positive for headaches (improved).   Psychiatric/Behavioral:  Positive for sleep disturbance (improved). The patient is nervous/anxious (less anxious).      Medical History Reviewed by provider this encounter:       Past Medical History   Past Medical History:   Diagnosis Date    Migraines      Past Surgical History:   Procedure Laterality Date    WISDOM TOOTH EXTRACTION       Family History   Problem Relation Age of Onset    Migraines Mother     Depression Father     Anxiety disorder Father     Anxiety disorder Sister     Depression Sister     No Known Problems Brother     No Known Problems Brother     No Known Problems Brother     Cancer Maternal Grandmother     Thyroid disease Maternal Grandmother     Uterine cancer Maternal Grandmother     Heart disease Maternal Grandmother     No Known Problems Maternal Grandfather     No Known Problems Paternal Grandmother     No Known Problems Paternal Grandfather     Uterine cancer Other     Uterine cancer Other     Mental illness Neg Hx     Substance Abuse Neg Hx      Current Outpatient Medications on File Prior to Visit   Medication Sig Dispense Refill    amitriptyline (ELAVIL) 25 mg tablet Take 1 tablet (25 mg total) by mouth daily at bedtime 30 tablet 0    levonorgestrel (KYLEENA) 19.5 MG intrauterine device 1 each by Intrauterine route once      Ubrogepant (UBRELVY) 50 MG tablet Take 1 tablet (50 mg) one time as needed for migraine. May repeat one additional tablet (50 mg) at least two hours after the first dose. Do not use more than two doses per day, or for more than eight days per month. 10 tablet 0    ALPRAZolam (XANAX) 0.25 mg tablet Take 1 tablet (0.25 mg total) by mouth if needed for anxiety (Patient not taking: Reported on 7/24/2024) 3 tablet 0    ketoconazole (NIZORAL) 2 % shampoo Apply 1 application topically once for 1 dose MARCELLO  2x/wk x 8 wk, then as needed.  Leave on 5 min then rinse. 120 mL 2    nystatin-triamcinolone (MYCOLOG-II) ointment Apply topically 2 (two) times a day (Patient not taking: Reported on 7/24/2024) 15 g 0    propranolol (INDERAL) 10 mg tablet TAKE 1 TABLET BY MOUTH TWICE A DAY (Patient not taking: Reported on 7/24/2024) 180 tablet 1     No current facility-administered medications on file prior to visit.   No Known Allergies   Current Outpatient Medications on File Prior to Visit   Medication Sig Dispense Refill    amitriptyline (ELAVIL) 25 mg tablet Take 1 tablet (25 mg total) by mouth daily at bedtime 30 tablet 0    levonorgestrel (KYLEENA) 19.5 MG intrauterine device 1 each by Intrauterine route once      Ubrogepant (UBRELVY) 50 MG tablet Take 1 tablet (50 mg) one time as needed for migraine. May repeat one additional tablet (50 mg) at least two hours after the first dose. Do not use more than two doses per day, or for more than eight days per month. 10 tablet 0    ALPRAZolam (XANAX) 0.25 mg tablet Take 1 tablet (0.25 mg total) by mouth if needed for anxiety (Patient not taking: Reported on 7/24/2024) 3 tablet 0    ketoconazole (NIZORAL) 2 % shampoo Apply 1 application topically once for 1 dose SHMP 2x/wk x 8 wk, then as needed.  Leave on 5 min then rinse. 120 mL 2    nystatin-triamcinolone (MYCOLOG-II) ointment Apply topically 2 (two) times a day (Patient not taking: Reported on 7/24/2024) 15 g 0    propranolol (INDERAL) 10 mg tablet TAKE 1 TABLET BY MOUTH TWICE A DAY (Patient not taking: Reported on 7/24/2024) 180 tablet 1     No current facility-administered medications on file prior to visit.      Social History     Tobacco Use    Smoking status: Never     Passive exposure: Never    Smokeless tobacco: Never   Vaping Use    Vaping status: Never Used   Substance and Sexual Activity    Alcohol use: Never    Drug use: Never    Sexual activity: Yes     Partners: Male     Birth control/protection: I.U.D.     Comment:  "Alex 10/5/21     Objective     /82 (BP Location: Left arm, Patient Position: Sitting, Cuff Size: Adult)   Pulse (!) 111   Temp 99.7 °F (37.6 °C) (Tympanic)   Resp 16   Ht 5' 5\" (1.651 m)   Wt 55.9 kg (123 lb 3.2 oz)   SpO2 98%   BMI 20.50 kg/m²     Physical Exam  Constitutional:       General: She is not in acute distress.     Appearance: Normal appearance. She is not ill-appearing.   HENT:      Head: Normocephalic.   Eyes:      Extraocular Movements: Extraocular movements intact.   Abdominal:      General: There is no distension.      Palpations: Abdomen is soft.      Tenderness: There is abdominal tenderness (suprapubic tenderness). There is no right CVA tenderness or left CVA tenderness.   Skin:     General: Skin is warm.   Neurological:      Mental Status: She is alert and oriented to person, place, and time.       Administrative Statements   I have spent a total time of 30 minutes in caring for this patient on the day of the visit/encounter including Risks and benefits of tx options, Instructions for management, Patient and family education, Importance of tx compliance, Risk factor reductions, Impressions, Documenting in the medical record, Reviewing / ordering tests, medicine, procedures  , and Obtaining or reviewing history  .        "

## 2024-08-19 ENCOUNTER — OFFICE VISIT (OUTPATIENT)
Dept: FAMILY MEDICINE CLINIC | Facility: CLINIC | Age: 19
End: 2024-08-19
Payer: COMMERCIAL

## 2024-08-19 ENCOUNTER — TELEPHONE (OUTPATIENT)
Dept: FAMILY MEDICINE CLINIC | Facility: CLINIC | Age: 19
End: 2024-08-19

## 2024-08-19 VITALS
HEIGHT: 65 IN | WEIGHT: 123.2 LBS | TEMPERATURE: 99.7 F | HEART RATE: 111 BPM | SYSTOLIC BLOOD PRESSURE: 110 MMHG | RESPIRATION RATE: 16 BRPM | DIASTOLIC BLOOD PRESSURE: 82 MMHG | OXYGEN SATURATION: 98 % | BODY MASS INDEX: 20.53 KG/M2

## 2024-08-19 DIAGNOSIS — Z23 ENCOUNTER FOR IMMUNIZATION: ICD-10-CM

## 2024-08-19 DIAGNOSIS — R39.9 UTI SYMPTOMS: ICD-10-CM

## 2024-08-19 DIAGNOSIS — F51.04 PSYCHOPHYSIOLOGICAL INSOMNIA: ICD-10-CM

## 2024-08-19 DIAGNOSIS — G43.719 INTRACTABLE CHRONIC MIGRAINE WITHOUT AURA AND WITHOUT STATUS MIGRAINOSUS: Primary | ICD-10-CM

## 2024-08-19 DIAGNOSIS — D50.8 IRON DEFICIENCY ANEMIA SECONDARY TO INADEQUATE DIETARY IRON INTAKE: ICD-10-CM

## 2024-08-19 DIAGNOSIS — F41.1 GENERALIZED ANXIETY DISORDER: ICD-10-CM

## 2024-08-19 DIAGNOSIS — Z01.84 IMMUNITY STATUS TESTING: ICD-10-CM

## 2024-08-19 PROCEDURE — 90746 HEPB VACCINE 3 DOSE ADULT IM: CPT

## 2024-08-19 PROCEDURE — 87077 CULTURE AEROBIC IDENTIFY: CPT | Performed by: FAMILY MEDICINE

## 2024-08-19 PROCEDURE — 90716 VAR VACCINE LIVE SUBQ: CPT

## 2024-08-19 PROCEDURE — 90472 IMMUNIZATION ADMIN EACH ADD: CPT

## 2024-08-19 PROCEDURE — 87086 URINE CULTURE/COLONY COUNT: CPT | Performed by: FAMILY MEDICINE

## 2024-08-19 PROCEDURE — 99214 OFFICE O/P EST MOD 30 MIN: CPT | Performed by: FAMILY MEDICINE

## 2024-08-19 PROCEDURE — 87186 SC STD MICRODIL/AGAR DIL: CPT | Performed by: FAMILY MEDICINE

## 2024-08-19 PROCEDURE — 90471 IMMUNIZATION ADMIN: CPT

## 2024-08-20 NOTE — TELEPHONE ENCOUNTER
Completed but will need a urine drug screen but do not inform her of this. This can be done at the next visit. Just inform her that the form will be completed when she returns next month

## 2024-08-21 ENCOUNTER — TELEPHONE (OUTPATIENT)
Dept: FAMILY MEDICINE CLINIC | Facility: CLINIC | Age: 19
End: 2024-08-21

## 2024-08-21 DIAGNOSIS — R31.9 URINARY TRACT INFECTION WITH HEMATURIA, SITE UNSPECIFIED: Primary | ICD-10-CM

## 2024-08-21 DIAGNOSIS — N39.0 URINARY TRACT INFECTION WITH HEMATURIA, SITE UNSPECIFIED: Primary | ICD-10-CM

## 2024-08-21 LAB — BACTERIA UR CULT: ABNORMAL

## 2024-08-21 RX ORDER — SULFAMETHOXAZOLE/TRIMETHOPRIM 800-160 MG
1 TABLET ORAL 2 TIMES DAILY
Qty: 6 TABLET | Refills: 0 | Status: SHIPPED | OUTPATIENT
Start: 2024-08-21 | End: 2024-08-24

## 2024-08-21 NOTE — TELEPHONE ENCOUNTER
Patient returned call.  Relayed results to patient as per provider message. Patient expressed understanding and did not have any further questions regarding results.                 Patient stated she left a physical packet to be completed when she was seen for her physical on 7/24/24 and was told that she would get the packet back at her next visit.  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  She would like to know if it is possible to  the packet before then.  Please advise.  Thank you!

## 2024-08-21 NOTE — TELEPHONE ENCOUNTER
----- Message from Airam Rosa MD sent at 8/21/2024  7:14 AM EDT -----  Please inform patient the urine culture came back positive and grew a common bacteria found in the urine. Will send a script for bactrim to take twice a day for 3 days.

## 2024-08-25 DIAGNOSIS — F41.1 GENERALIZED ANXIETY DISORDER: ICD-10-CM

## 2024-08-25 DIAGNOSIS — F51.04 PSYCHOPHYSIOLOGICAL INSOMNIA: ICD-10-CM

## 2024-08-25 DIAGNOSIS — G43.719 INTRACTABLE CHRONIC MIGRAINE WITHOUT AURA AND WITHOUT STATUS MIGRAINOSUS: ICD-10-CM

## 2024-09-05 DIAGNOSIS — G43.719 INTRACTABLE CHRONIC MIGRAINE WITHOUT AURA AND WITHOUT STATUS MIGRAINOSUS: ICD-10-CM

## 2024-09-05 NOTE — TELEPHONE ENCOUNTER
Reason for call: out of medication  [x] Refill   [] Prior Auth  [] Other:     Office:   [x] PCP/Provider - Dr Rosa  [] Specialty/Provider -     Medication: Ubrelvy     Dose/Frequency: 50 mg as needed    Quantity: ? (Last order qty 10)    Pharmacy: [facility] [place]     Does the patient have enough for 3 days?   [] Yes   [x] No - Send as HP to POD

## 2024-09-09 ENCOUNTER — APPOINTMENT (OUTPATIENT)
Dept: LAB | Facility: CLINIC | Age: 19
End: 2024-09-09
Payer: COMMERCIAL

## 2024-09-09 DIAGNOSIS — Z01.84 IMMUNITY STATUS TESTING: ICD-10-CM

## 2024-09-09 DIAGNOSIS — Z23 ENCOUNTER FOR IMMUNIZATION: ICD-10-CM

## 2024-09-09 LAB
HBV SURFACE AB SER-ACNC: >500 MIU/ML
VZV IGG SER QL IA: NORMAL

## 2024-09-09 PROCEDURE — 86706 HEP B SURFACE ANTIBODY: CPT

## 2024-09-09 PROCEDURE — 36415 COLL VENOUS BLD VENIPUNCTURE: CPT

## 2024-09-09 PROCEDURE — 86787 VARICELLA-ZOSTER ANTIBODY: CPT

## 2024-09-10 ENCOUNTER — OFFICE VISIT (OUTPATIENT)
Dept: FAMILY MEDICINE CLINIC | Facility: CLINIC | Age: 19
End: 2024-09-10
Payer: COMMERCIAL

## 2024-09-10 VITALS
SYSTOLIC BLOOD PRESSURE: 108 MMHG | TEMPERATURE: 97.2 F | OXYGEN SATURATION: 98 % | WEIGHT: 124 LBS | DIASTOLIC BLOOD PRESSURE: 70 MMHG | BODY MASS INDEX: 20.66 KG/M2 | RESPIRATION RATE: 17 BRPM | HEIGHT: 65 IN | HEART RATE: 102 BPM

## 2024-09-10 DIAGNOSIS — R39.9 UTI SYMPTOMS: ICD-10-CM

## 2024-09-10 DIAGNOSIS — Z97.5 IUD (INTRAUTERINE DEVICE) IN PLACE: ICD-10-CM

## 2024-09-10 DIAGNOSIS — Z12.4 CERVICAL CANCER SCREENING: ICD-10-CM

## 2024-09-10 DIAGNOSIS — Z01.84 IMMUNITY STATUS TESTING: Primary | ICD-10-CM

## 2024-09-10 PROCEDURE — 99214 OFFICE O/P EST MOD 30 MIN: CPT | Performed by: FAMILY MEDICINE

## 2024-09-10 NOTE — PROGRESS NOTES
Encounter department: [facility]    Assessment & Plan  Immunity status testing  Recent testing showed immunity following booster. Forms completed for school and handed to the patient. 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 Notes bleeding which did not occur when it was first placed. Due to be replaced in 3-5 years. Would discuss replacing IUD w/ gyn        UTI symptoms  Symptoms resolved        Cervical cancer screening  Due but may also consider replacing IUD as its nearing its expiration. Referred to gyn           History of Present Illness     Here to follow up labs and UTI symptoms   Doing well   Has kyleena which was placed 3 years ago   Started having bleeding which was not present before  Questions if it should be replaced   Due for cervical cancer screening  UTI Symptoms resolved  Recent titers showed immunity following booster      History obtained from : patient  Review of Systems   Genitourinary:  Negative for dysuria, flank pain and hematuria.   Medical History Reviewed by provider this encounter:       Past Medical History   Past Medical History:   Diagnosis Date   • Migraines      Past Surgical History:   Procedure Laterality Date   • WISDOM TOOTH EXTRACTION       Family History   Problem Relation Age of Onset   • Migraines Mother    • Depression Father    • Anxiety disorder Father    • Anxiety disorder Sister    • Depression Sister    • No Known Problems Brother    • No Known Problems Brother    • No Known Problems Brother    • Cancer Maternal Grandmother    • Thyroid disease Maternal Grandmother    • Uterine cancer Maternal Grandmother    • Heart disease Maternal Grandmother    • No Known Problems Maternal Grandfather    • No Known Problems Paternal Grandmother    • No Known Problems Paternal Grandfather    • Uterine cancer Other    • Uterine cancer Other    • Mental illness Neg Hx    • Substance Abuse Neg Hx       Current Outpatient Medications on File Prior to Visit   Medication Sig Dispense Refill   • amitriptyline (ELAVIL) 25 mg tablet TAKE 1 TABLET BY MOUTH DAILY AT BEDTIME 30 tablet 0   • levonorgestrel (KYLEENA) 19.5 MG intrauterine device 1 each by Intrauterine route once     • Ubrogepant (UBRELVY) 50 MG tablet Take 1 tablet (50 mg) one time as needed for migraine. May repeat one additional tablet (50 mg) at least two hours after the first dose. Do not use more than two doses per day, or for more than eight days per month. 10 tablet 1   • ALPRAZolam (XANAX) 0.25 mg tablet Take 1 tablet (0.25 mg total) by mouth if needed for anxiety (Patient not taking: Reported on 7/24/2024) 3 tablet 0   • ketoconazole (NIZORAL) 2 % shampoo Apply 1 application topically once for 1 dose SHMP 2x/wk x 8 wk, then as needed.  Leave on 5 min then rinse. 120 mL 2   • nystatin-triamcinolone (MYCOLOG-II) ointment Apply topically 2 (two) times a day (Patient not taking: Reported on 7/24/2024) 15 g 0   • propranolol (INDERAL) 10 mg tablet TAKE 1 TABLET BY MOUTH TWICE A DAY (Patient not taking: Reported on 9/10/2024) 180 tablet 1     No current facility-administered medications on file prior to visit.   No Known Allergies   Current Outpatient Medications on File Prior to Visit   Medication Sig Dispense Refill   • amitriptyline (ELAVIL) 25 mg tablet TAKE 1 TABLET BY MOUTH DAILY AT BEDTIME 30 tablet 0   • levonorgestrel (KYLEENA) 19.5 MG intrauterine device 1 each by Intrauterine route once     • Ubrogepant (UBRELVY) 50 MG tablet Take 1 tablet (50 mg) one time as needed for migraine. May repeat one additional tablet (50 mg) at least two hours after the first dose. Do not use more than two doses per day, or for more than eight days per month. 10 tablet 1   • ALPRAZolam (XANAX) 0.25 mg tablet Take 1 tablet (0.25 mg total) by mouth if needed for anxiety (Patient not taking: Reported on 7/24/2024) 3 tablet 0   • ketoconazole (NIZORAL) 2 % shampoo  "Apply 1 application topically once for 1 dose SHMP 2x/wk x 8 wk, then as needed.  Leave on 5 min then rinse. 120 mL 2   • nystatin-triamcinolone (MYCOLOG-II) ointment Apply topically 2 (two) times a day (Patient not taking: Reported on 7/24/2024) 15 g 0   • propranolol (INDERAL) 10 mg tablet TAKE 1 TABLET BY MOUTH TWICE A DAY (Patient not taking: Reported on 9/10/2024) 180 tablet 1     No current facility-administered medications on file prior to visit.      Social History     Tobacco Use   • Smoking status: Never     Passive exposure: Never   • Smokeless tobacco: Never   Vaping Use   • Vaping status: Never Used   Substance and Sexual Activity   • Alcohol use: Never   • Drug use: Never   • Sexual activity: Yes     Partners: Male     Birth control/protection: I.U.D.     Comment: Alex 10/5/21         Objective     /70 (BP Location: Left arm, Patient Position: Sitting, Cuff Size: Standard)   Pulse 102   Temp (!) 97.2 °F (36.2 °C) (Tympanic)   Resp 17   Ht 5' 5\" (1.651 m)   Wt 56.2 kg (124 lb)   SpO2 98%   BMI 20.63 kg/m²     Physical Exam  Constitutional:       General: She is not in acute distress.     Appearance: Normal appearance. She is not ill-appearing or toxic-appearing.   HENT:      Head: Normocephalic and atraumatic.   Eyes:      Extraocular Movements: Extraocular movements intact.   Pulmonary:      Effort: Pulmonary effort is normal.   Skin:     General: Skin is warm.   Neurological:      Mental Status: She is alert and oriented to person, place, and time.     "

## 2024-09-10 NOTE — PROGRESS NOTES
Ambulatory Visit  Name: Bacilio Salinas      : 2005      MRN: [id number]  Encounter Provider: Airam Rosa MD  Encounter Date: 9/10/2024   Encounter department: [facility]    Assessment & Plan  Immunity status testing         IUD (intrauterine device) in place         UTI symptoms         Cervical cancer screening            History of Present Illness   {Disappearing Hyperlinks I Encounters * My Last Note * Since Last Visit * History :51368}  Doing well   Has kyleena which was placed 3 years ago   Started having bleeding which was not present before  Questions if it should be replaced   Due for cervical cancer screening  UTI Symptoms resolved          History obtained from : patient  Review of Systems   Genitourinary:  Negative for dyspareunia, frequency, hematuria and pelvic pain.     Medical History Reviewed by provider this encounter:       Past Medical History   Past Medical History:   Diagnosis Date    Migraines      Past Surgical History:   Procedure Laterality Date    WISDOM TOOTH EXTRACTION       Family History   Problem Relation Age of Onset    Migraines Mother     Depression Father     Anxiety disorder Father     Anxiety disorder Sister     Depression Sister     No Known Problems Brother     No Known Problems Brother     No Known Problems Brother     Cancer Maternal Grandmother     Thyroid disease Maternal Grandmother     Uterine cancer Maternal Grandmother     Heart disease Maternal Grandmother     No Known Problems Maternal Grandfather     No Known Problems Paternal Grandmother     No Known Problems Paternal Grandfather     Uterine cancer Other     Uterine cancer Other     Mental illness Neg Hx     Substance Abuse Neg Hx      Current Outpatient Medications on File Prior to Visit   Medication Sig Dispense Refill    amitriptyline (ELAVIL) 25 mg tablet TAKE 1 TABLET BY MOUTH DAILY AT BEDTIME 30 tablet 0    levonorgestrel (KYLEENA) 19.5 MG intrauterine device 1 each by  Intrauterine route once      Ubrogepant (UBRELVY) 50 MG tablet Take 1 tablet (50 mg) one time as needed for migraine. May repeat one additional tablet (50 mg) at least two hours after the first dose. Do not use more than two doses per day, or for more than eight days per month. 10 tablet 1    ALPRAZolam (XANAX) 0.25 mg tablet Take 1 tablet (0.25 mg total) by mouth if needed for anxiety (Patient not taking: Reported on 7/24/2024) 3 tablet 0    ketoconazole (NIZORAL) 2 % shampoo Apply 1 application topically once for 1 dose SHMP 2x/wk x 8 wk, then as needed.  Leave on 5 min then rinse. 120 mL 2    nystatin-triamcinolone (MYCOLOG-II) ointment Apply topically 2 (two) times a day (Patient not taking: Reported on 7/24/2024) 15 g 0    propranolol (INDERAL) 10 mg tablet TAKE 1 TABLET BY MOUTH TWICE A DAY (Patient not taking: Reported on 9/10/2024) 180 tablet 1     No current facility-administered medications on file prior to visit.   No Known Allergies   Current Outpatient Medications on File Prior to Visit   Medication Sig Dispense Refill    amitriptyline (ELAVIL) 25 mg tablet TAKE 1 TABLET BY MOUTH DAILY AT BEDTIME 30 tablet 0    levonorgestrel (KYLEENA) 19.5 MG intrauterine device 1 each by Intrauterine route once      Ubrogepant (UBRELVY) 50 MG tablet Take 1 tablet (50 mg) one time as needed for migraine. May repeat one additional tablet (50 mg) at least two hours after the first dose. Do not use more than two doses per day, or for more than eight days per month. 10 tablet 1    ALPRAZolam (XANAX) 0.25 mg tablet Take 1 tablet (0.25 mg total) by mouth if needed for anxiety (Patient not taking: Reported on 7/24/2024) 3 tablet 0    ketoconazole (NIZORAL) 2 % shampoo Apply 1 application topically once for 1 dose SHMP 2x/wk x 8 wk, then as needed.  Leave on 5 min then rinse. 120 mL 2    nystatin-triamcinolone (MYCOLOG-II) ointment Apply topically 2 (two) times a day (Patient not taking: Reported on 7/24/2024) 15 g 0     "propranolol (INDERAL) 10 mg tablet TAKE 1 TABLET BY MOUTH TWICE A DAY (Patient not taking: Reported on 9/10/2024) 180 tablet 1     No current facility-administered medications on file prior to visit.      Social History     Tobacco Use    Smoking status: Never     Passive exposure: Never    Smokeless tobacco: Never   Vaping Use    Vaping status: Never Used   Substance and Sexual Activity    Alcohol use: Never    Drug use: Never    Sexual activity: Yes     Partners: Male     Birth control/protection: I.U.D.     Comment: Alex 10/5/21         Objective   {Disappearing Hyperlinks   Review Vitals * Enter New Vitals * Results Review * Labs * Imaging * Cardiology * Procedures * Lung Cancer Screening * Surgical eConsent :51332}  /70 (BP Location: Left arm, Patient Position: Sitting, Cuff Size: Standard)   Pulse 102   Temp (!) 97.2 °F (36.2 °C) (Tympanic)   Resp 17   Ht 5' 5\" (1.651 m)   Wt 56.2 kg (124 lb)   SpO2 98%   BMI 20.63 kg/m²     Physical Exam  Constitutional:       General: She is not in acute distress.     Appearance: Normal appearance. She is not ill-appearing or toxic-appearing.   HENT:      Head: Normocephalic and atraumatic.   Pulmonary:      Effort: Pulmonary effort is normal.   Skin:     General: Skin is warm.   Neurological:      Mental Status: She is alert and oriented to person, place, and time.         "

## 2024-09-17 ENCOUNTER — ANNUAL EXAM (OUTPATIENT)
Dept: OBGYN CLINIC | Facility: CLINIC | Age: 19
End: 2024-09-17
Payer: COMMERCIAL

## 2024-09-17 VITALS — WEIGHT: 124 LBS | BODY MASS INDEX: 20.63 KG/M2 | SYSTOLIC BLOOD PRESSURE: 110 MMHG | DIASTOLIC BLOOD PRESSURE: 80 MMHG

## 2024-09-17 DIAGNOSIS — Z30.430 ENCOUNTER FOR IUD INSERTION: Primary | ICD-10-CM

## 2024-09-17 DIAGNOSIS — Z01.419 WOMEN'S ANNUAL ROUTINE GYNECOLOGICAL EXAMINATION: ICD-10-CM

## 2024-09-17 DIAGNOSIS — Z11.3 SCREENING FOR STD (SEXUALLY TRANSMITTED DISEASE): ICD-10-CM

## 2024-09-17 PROCEDURE — 87591 N.GONORRHOEAE DNA AMP PROB: CPT | Performed by: OBSTETRICS & GYNECOLOGY

## 2024-09-17 PROCEDURE — S0612 ANNUAL GYNECOLOGICAL EXAMINA: HCPCS | Performed by: OBSTETRICS & GYNECOLOGY

## 2024-09-17 PROCEDURE — G0145 SCR C/V CYTO,THINLAYER,RESCR: HCPCS | Performed by: OBSTETRICS & GYNECOLOGY

## 2024-09-17 PROCEDURE — 87491 CHLMYD TRACH DNA AMP PROBE: CPT | Performed by: OBSTETRICS & GYNECOLOGY

## 2024-09-17 RX ORDER — MISOPROSTOL 200 UG/1
TABLET ORAL
Qty: 3 TABLET | Refills: 0 | Status: SHIPPED | OUTPATIENT
Start: 2024-09-17 | End: 2024-09-18

## 2024-09-17 NOTE — PROGRESS NOTES
Ambulatory Visit  Name: Bacilio Salinas      : 2005      MRN: [id number]  Encounter Provider: Ag Vieira MD  Encounter Date: 2024   Encounter department: [facility] G[facility]    Assessment & Plan  Women's annual routine gynecological examination       The patient was informed of a stable GYN examination.  A Pap smear was performed.  The IUD string was seen.  The patient wants to make arrangements for changing of her IUD from the 5-year IUD to the 8-year IUD.  There is insurance changed because her periods are getting heavier.  Will make arrangements for removal and insertion of the IUD around her next menstrual cycle.  I will pretreat with Cytotec the night before the procedure.  A prescription has been sent.  Will await the results of the Pap smear as of issues were discussed with her.  All questions were answered.  She was given a brochure about the right IUD.    History of Present Illness     Bacilio Salinas is a 19 y.o. adult who presents for an annual GYN examination.  She is nulliparous.  She is in a stable relationship for the last 2 years.  She did have a history positive for chlamydia and BV approximately 2 years ago.  She came in for test of cure and everything was negative.  She declines testing today.  She will need a Pap smear today.  Currently she has the Kyleena IUD in for contraception.  She is concerned because her periods are back regular and heavy and she wants to change to a different IUD.  We will consider putting the Mirena.  I talked about using Cytotec for cervical ripening prior to removal and insertion.  She agrees with the plan.  She denies any major  or GI complaint.  She feels safe at home.  She sees a dentist on a regular basis.  She does have a history of anxiety she is currently on Elavil some depression this is working well.  She denies any suicidal thoughts the present time.  She is happy with her weight.  She is currently going to school become an LPN family  history reviewed no major illnesses to report.  Both her parents are alive and well.  She is not sure she received the Gardasil vaccine.  She did not Clines any history of surgery.  She does have a history of migraine headaches and is treated medically which is working well.      Review of Systems   All other systems reviewed and are negative.          Objective     /80   Wt 56.2 kg (124 lb)   LMP 08/30/2024 (Exact Date) Comment: pt has IUd  BMI 20.63 kg/m²     Physical Exam  Vitals reviewed. Exam conducted with a chaperone present.   Constitutional:       Appearance: Normal appearance. She is normal weight.   HENT:      Head: Normocephalic and atraumatic.      Mouth/Throat:      Mouth: Mucous membranes are moist.   Eyes:      Extraocular Movements: Extraocular movements intact.      Pupils: Pupils are equal, round, and reactive to light.   Cardiovascular:      Rate and Rhythm: Normal rate and regular rhythm.      Pulses: Normal pulses.      Heart sounds: Normal heart sounds.   Pulmonary:      Effort: Pulmonary effort is normal.      Breath sounds: Normal breath sounds.   Chest:   Breasts:     Right: Normal. No swelling, bleeding or inverted nipple.      Left: Normal. No swelling, bleeding, inverted nipple or mass.   Abdominal:      General: Abdomen is flat. Bowel sounds are normal. There is no distension.      Palpations: Abdomen is soft. There is no hepatomegaly, splenomegaly or mass.      Tenderness: There is no abdominal tenderness.      Hernia: No hernia is present. There is no hernia in the left inguinal area or right inguinal area.   Genitourinary:     General: Normal vulva.      Pubic Area: No rash or pubic lice.       Penis: Normal.       Testes: Normal.      Labia:         Right: No rash, tenderness, lesion or injury.         Left: No rash, tenderness, lesion or injury.       Urethra: No prolapse, urethral pain, urethral swelling or urethral lesion.      Vagina: Normal. No signs of injury. No  vaginal discharge, tenderness or lesions.      Cervix: Normal.      Uterus: Normal.       Adnexa: Right adnexa normal and left adnexa normal.      Rectum: Normal.      Comments: The external genitalia are within her normal limits, the vagina is clean.  The uterus is normal size is retroverted.  The IUD string was seen coming through the cervical os.  A Pap smear was performed.  We also will screen for GC and chlamydia.  The adnexa clear bilaterally.  There is no evidence of prolapse.  Urethra and bladder normal working relationship.  Musculoskeletal:         General: Normal range of motion.      Cervical back: Normal range of motion and neck supple.   Lymphadenopathy:      Upper Body:      Right upper body: No supraclavicular adenopathy.      Left upper body: No supraclavicular adenopathy.      Lower Body: No right inguinal adenopathy. No left inguinal adenopathy.   Skin:     General: Skin is warm.   Neurological:      General: No focal deficit present.      Mental Status: She is alert and oriented to person, place, and time.   Psychiatric:         Mood and Affect: Mood normal.         Behavior: Behavior normal.

## 2024-09-17 NOTE — PATIENT INSTRUCTIONS
The patient was informed of a stable GYN examination.  A Pap smear was performed.  We will screen for GC and chlamydia.  The IUD string was seen.  She like to have her IUD removed and inserted with 8-year IUD.  A brochure about the Mirena was given.  Will make arrangements for removal and insertion of IUD around her next menstrual cycle.  I have asked her to pretreat with Cytotec the night before the procedure if she is in the morning, or the morning if she is coming scheduled in the afternoon.  She is also instructed to make sure she has breakfast.  Is also instructed to take Aleve 2 tablets an hour before the procedure.  If she has questions she may call.

## 2024-09-19 LAB
C TRACH DNA SPEC QL NAA+PROBE: NEGATIVE
N GONORRHOEA DNA SPEC QL NAA+PROBE: NEGATIVE

## 2024-09-21 DIAGNOSIS — G43.719 INTRACTABLE CHRONIC MIGRAINE WITHOUT AURA AND WITHOUT STATUS MIGRAINOSUS: ICD-10-CM

## 2024-09-21 DIAGNOSIS — F51.04 PSYCHOPHYSIOLOGICAL INSOMNIA: ICD-10-CM

## 2024-09-21 DIAGNOSIS — F41.1 GENERALIZED ANXIETY DISORDER: ICD-10-CM

## 2024-09-23 LAB
LAB AP GYN PRIMARY INTERPRETATION: NORMAL
Lab: NORMAL

## 2024-09-30 ENCOUNTER — PROCEDURE VISIT (OUTPATIENT)
Dept: OBGYN CLINIC | Facility: CLINIC | Age: 19
End: 2024-09-30
Payer: COMMERCIAL

## 2024-09-30 VITALS — SYSTOLIC BLOOD PRESSURE: 108 MMHG | BODY MASS INDEX: 21.13 KG/M2 | WEIGHT: 127 LBS | DIASTOLIC BLOOD PRESSURE: 70 MMHG

## 2024-09-30 DIAGNOSIS — Z30.432 ENCOUNTER FOR IUD REMOVAL: ICD-10-CM

## 2024-09-30 DIAGNOSIS — Z30.433 ENCOUNTER FOR REMOVAL AND REINSERTION OF INTRAUTERINE CONTRACEPTIVE DEVICE (IUD): Primary | ICD-10-CM

## 2024-09-30 PROCEDURE — 58301 REMOVE INTRAUTERINE DEVICE: CPT | Performed by: OBSTETRICS & GYNECOLOGY

## 2024-09-30 PROCEDURE — 58300 INSERT INTRAUTERINE DEVICE: CPT | Performed by: OBSTETRICS & GYNECOLOGY

## 2024-09-30 NOTE — PATIENT INSTRUCTIONS
The patient tolerated removal and insertion of the Mirena IUD for difficulty.  Transabdominal ultrasound show proper placement evidence of perforation.  Instruction booklet was given.  She should return to my office in 5 weeks for reevaluation.  Fever chills or other problems she will call us right away thank you

## 2024-09-30 NOTE — PROGRESS NOTES
IUD Procedure    Date/Time: 9/30/2024 2:05 PM    Performed by: Ag Vieira MD  Authorized by: Ag Vieira MD    Other Assisting Provider: No    Verbal consent obtained?: Yes    Written consent obtained?: Yes    Consent given by:  Patient  Time Out:     Time out performed at:  9/30/2024 2:00 PM  Patient states understanding of procedure being performed: Yes    Patient's understanding of procedure matches consent: Yes    Procedure consent matches procedure scheduled: Yes    Relevant documents present and verified: Yes    Test results available and properly labeled: No    Site marked: No    Radiology Images displayed and confirmed. If images not available, report reviewed: No    Patient identity confirmed:  Verbally with patient  Select procedure: IUD insertion    IUD Insertion:     Pelvic exam performed: yes      Negative GC/chlamydia test: yes      Cervix cleaned and prepped: yes      Speculum placed in vagina: yes      Tenaculum applied to cervix: no      Allis applied to cervix: yes      IUD inserted with no complications: yes      Strings trimmed: yes      Uterus sounded: yes      Uterus sound depth (cm):  6    IUD type:  Levonorgestrel 20 MCG/DAY  Insertion Comments:      The IUD was inserted for difficulty prior to inserting the new IUD the prior IUD been removed and shown to the patient prior to disposal.  There is no evidence of perforation.  Transabdominal ultrasound was performed showing the IUD was in the endometrial cavity.  Patient taught procedure well.  She return the office in 5 weeks for IUD follow-up.

## 2024-10-04 DIAGNOSIS — G43.719 INTRACTABLE CHRONIC MIGRAINE WITHOUT AURA AND WITHOUT STATUS MIGRAINOSUS: ICD-10-CM

## 2024-10-09 PROBLEM — N90.89 VULVAR IRRITATION: Status: RESOLVED | Noted: 2020-07-08 | Resolved: 2024-10-09

## 2024-11-01 DIAGNOSIS — G43.719 INTRACTABLE CHRONIC MIGRAINE WITHOUT AURA AND WITHOUT STATUS MIGRAINOSUS: ICD-10-CM

## 2024-11-06 ENCOUNTER — TELEPHONE (OUTPATIENT)
Dept: NEUROLOGY | Facility: CLINIC | Age: 19
End: 2024-11-06

## 2024-11-19 ENCOUNTER — OFFICE VISIT (OUTPATIENT)
Dept: OBGYN CLINIC | Facility: CLINIC | Age: 19
End: 2024-11-19
Payer: COMMERCIAL

## 2024-11-19 VITALS — DIASTOLIC BLOOD PRESSURE: 68 MMHG | SYSTOLIC BLOOD PRESSURE: 118 MMHG | BODY MASS INDEX: 21.13 KG/M2 | WEIGHT: 127 LBS

## 2024-11-19 DIAGNOSIS — Z30.431 IUD CHECK UP: Primary | ICD-10-CM

## 2024-11-19 PROCEDURE — 99213 OFFICE O/P EST LOW 20 MIN: CPT | Performed by: OBSTETRICS & GYNECOLOGY

## 2024-11-19 NOTE — PROGRESS NOTES
This is a 19-year-old white female for follow-up of her Mirena IUD insertion.  She denies any complaints or issues her partner has no issues.  She is doing well.  Abdominal ultrasound show proper placement of the IUD without evidence of perforation.  Pelvic exam shows the cervical string present there is no evidence of expulsion of the IUD.  She should return to my office in 10 months for her yearly exam.

## 2024-11-19 NOTE — PATIENT INSTRUCTIONS
The patient was informed that her IUD was stable and not moved.  She is happy with it.  She should return to my office in 10 months for yearly exam.

## 2024-12-03 ENCOUNTER — HOSPITAL ENCOUNTER (INPATIENT)
Facility: HOSPITAL | Age: 19
LOS: 1 days | Discharge: HOME | DRG: 103 | End: 2024-12-04
Attending: EMERGENCY MEDICINE | Admitting: PSYCHIATRY & NEUROLOGY
Payer: COMMERCIAL

## 2024-12-03 ENCOUNTER — APPOINTMENT (INPATIENT)
Dept: RADIOLOGY | Facility: HOSPITAL | Age: 19
DRG: 103 | End: 2024-12-03
Payer: COMMERCIAL

## 2024-12-03 ENCOUNTER — TELEPHONE (OUTPATIENT)
Age: 19
End: 2024-12-03

## 2024-12-03 ENCOUNTER — NURSE TRIAGE (OUTPATIENT)
Age: 19
End: 2024-12-03

## 2024-12-03 DIAGNOSIS — G43.919 INTRACTABLE MIGRAINE WITHOUT STATUS MIGRAINOSUS, UNSPECIFIED MIGRAINE TYPE: Primary | ICD-10-CM

## 2024-12-03 DIAGNOSIS — F51.04 PSYCHOPHYSIOLOGICAL INSOMNIA: ICD-10-CM

## 2024-12-03 DIAGNOSIS — F41.1 GENERALIZED ANXIETY DISORDER: ICD-10-CM

## 2024-12-03 DIAGNOSIS — G44.89 OTHER HEADACHE SYNDROME: ICD-10-CM

## 2024-12-03 DIAGNOSIS — G43.719 INTRACTABLE CHRONIC MIGRAINE WITHOUT AURA AND WITHOUT STATUS MIGRAINOSUS: ICD-10-CM

## 2024-12-03 PROBLEM — R51.9 HEADACHE: Status: ACTIVE | Noted: 2024-12-03

## 2024-12-03 LAB
EXT PREGNANCY TEST URINE: NEGATIVE
EXT. CONTROL: NORMAL

## 2024-12-03 PROCEDURE — 99223 1ST HOSP IP/OBS HIGH 75: CPT | Performed by: PSYCHIATRY & NEUROLOGY

## 2024-12-03 PROCEDURE — 96376 TX/PRO/DX INJ SAME DRUG ADON: CPT

## 2024-12-03 PROCEDURE — 96375 TX/PRO/DX INJ NEW DRUG ADDON: CPT

## 2024-12-03 PROCEDURE — 96365 THER/PROPH/DIAG IV INF INIT: CPT

## 2024-12-03 PROCEDURE — 81025 URINE PREGNANCY TEST: CPT | Performed by: NURSE PRACTITIONER

## 2024-12-03 PROCEDURE — 99284 EMERGENCY DEPT VISIT MOD MDM: CPT

## 2024-12-03 PROCEDURE — 70551 MRI BRAIN STEM W/O DYE: CPT

## 2024-12-03 PROCEDURE — 96361 HYDRATE IV INFUSION ADD-ON: CPT

## 2024-12-03 RX ORDER — SUMATRIPTAN 50 MG/1
100 TABLET, FILM COATED ORAL ONCE AS NEEDED
Status: COMPLETED | OUTPATIENT
Start: 2024-12-03 | End: 2024-12-03

## 2024-12-03 RX ORDER — ALPRAZOLAM 0.25 MG/1
0.25 TABLET ORAL 3 TIMES DAILY PRN
Status: DISCONTINUED | OUTPATIENT
Start: 2024-12-03 | End: 2024-12-04 | Stop reason: HOSPADM

## 2024-12-03 RX ORDER — METOCLOPRAMIDE HYDROCHLORIDE 5 MG/ML
10 INJECTION INTRAMUSCULAR; INTRAVENOUS EVERY 6 HOURS PRN
Status: DISCONTINUED | OUTPATIENT
Start: 2024-12-03 | End: 2024-12-04 | Stop reason: HOSPADM

## 2024-12-03 RX ORDER — ACETAMINOPHEN 325 MG/1
650 TABLET ORAL ONCE
Status: COMPLETED | OUTPATIENT
Start: 2024-12-03 | End: 2024-12-03

## 2024-12-03 RX ORDER — AMITRIPTYLINE HYDROCHLORIDE 10 MG/1
40 TABLET ORAL
Status: DISCONTINUED | OUTPATIENT
Start: 2024-12-03 | End: 2024-12-04 | Stop reason: HOSPADM

## 2024-12-03 RX ORDER — METOCLOPRAMIDE HYDROCHLORIDE 5 MG/ML
10 INJECTION INTRAMUSCULAR; INTRAVENOUS ONCE
Status: COMPLETED | OUTPATIENT
Start: 2024-12-03 | End: 2024-12-03

## 2024-12-03 RX ORDER — KETOROLAC TROMETHAMINE 30 MG/ML
15 INJECTION, SOLUTION INTRAMUSCULAR; INTRAVENOUS ONCE
Status: COMPLETED | OUTPATIENT
Start: 2024-12-03 | End: 2024-12-03

## 2024-12-03 RX ORDER — LORAZEPAM 2 MG/ML
1 INJECTION INTRAMUSCULAR
Status: COMPLETED | OUTPATIENT
Start: 2024-12-03 | End: 2024-12-03

## 2024-12-03 RX ORDER — KETOROLAC TROMETHAMINE 30 MG/ML
15 INJECTION, SOLUTION INTRAMUSCULAR; INTRAVENOUS EVERY 6 HOURS PRN
Status: DISCONTINUED | OUTPATIENT
Start: 2024-12-03 | End: 2024-12-04 | Stop reason: HOSPADM

## 2024-12-03 RX ORDER — MAGNESIUM SULFATE HEPTAHYDRATE 40 MG/ML
2 INJECTION, SOLUTION INTRAVENOUS ONCE
Status: COMPLETED | OUTPATIENT
Start: 2024-12-03 | End: 2024-12-03

## 2024-12-03 RX ADMIN — LORAZEPAM 1 MG: 2 INJECTION INTRAMUSCULAR; INTRAVENOUS at 22:50

## 2024-12-03 RX ADMIN — MAGNESIUM SULFATE HEPTAHYDRATE 2 G: 40 INJECTION, SOLUTION INTRAVENOUS at 10:49

## 2024-12-03 RX ADMIN — METOCLOPRAMIDE 10 MG: 5 INJECTION, SOLUTION INTRAMUSCULAR; INTRAVENOUS at 10:49

## 2024-12-03 RX ADMIN — SODIUM CHLORIDE 1000 ML: 0.9 INJECTION, SOLUTION INTRAVENOUS at 10:49

## 2024-12-03 RX ADMIN — SUMATRIPTAN SUCCINATE 100 MG: 50 TABLET ORAL at 15:31

## 2024-12-03 RX ADMIN — METOCLOPRAMIDE 10 MG: 5 INJECTION, SOLUTION INTRAMUSCULAR; INTRAVENOUS at 15:31

## 2024-12-03 RX ADMIN — KETOROLAC TROMETHAMINE 15 MG: 30 INJECTION, SOLUTION INTRAMUSCULAR; INTRAVENOUS at 10:47

## 2024-12-03 RX ADMIN — AMITRIPTYLINE HYDROCHLORIDE 40 MG: 10 TABLET, FILM COATED ORAL at 21:22

## 2024-12-03 RX ADMIN — ACETAMINOPHEN 650 MG: 325 TABLET, FILM COATED ORAL at 10:47

## 2024-12-03 RX ADMIN — KETOROLAC TROMETHAMINE 15 MG: 30 INJECTION, SOLUTION INTRAMUSCULAR; INTRAVENOUS at 15:30

## 2024-12-03 NOTE — ED PROVIDER NOTES
ED Disposition       None          Assessment & Plan       MDM  Pt is a 19-year-old female history of migraines sees primary care and neurologist outpatient, and is coming in today for intermittent migraine like headache for the last 2 weeks.  Was asked by neurologist and primary care to come in for evaluation given some vision changes.  States that she has been having some intermittent headache for the last 2 weeks, associated with left-sided vision changes, states that sometimes she feels like she is wearing sunglasses, no vision loss, no blurry vision.  Denies thunderclap headache, no trauma, no neurologic deficit indicated after waking again, no neck pain, no fever.      No history of clots in the past.  On exam patient is neurologically intact, extra ocular movements intact.  Will give a migraine cocktail, check visual acuity, since she has been sent here from outpatient neurology will reach out to them.  At this point I do not see an indication for imaging, at this point low concern for acute intra cranial pathology including cavernous venous thrombosis.    Initial presentation pt is   General: VSS, NAD, awake, alert. Well-nourished, well-developed. Appears stated age.   Speaking normally in full sentences.   Head: Normocephalic, atraumatic, nontender.  Eyes: PERRL, EOM-I. No diplopia.   No hyphema.   No subconjunctival hemorrhages.  Symmetrical lids.   ENT: Atraumatic external nose and ears.    MMM  No malocclusion. No stridor. Normal phonation. No drooling. Normal swallowing.   Neck: Symmetric, trachea midline. No JVD.  CV: RRR. +S1/S2  No murmurs or gallops  Peripheral pulses +2 throughout. No chest wall tenderness.   Lungs:   Unlabored No retractions  CTAB, lungs sounds equal bilateral.   No tachypnea.   Abd: +BS, soft, NT/ND.   MSK:   FROM   Back:   No rashes  Skin: Dry, intact.   Neuro: AAOx3, GCS 15, CN II-XII grossly intact.   Motor grossly intact.  Psychiatric/Behavioral: Appropriate mood and  affect   Exam: deferred    Ddx: Migraine, low concern for acute intracranial abnormality    Plan: Will treat symptomatically with migraine cocktail, consulted neurology who evaluated patient at bedside and recommended admission for MRI/concern for eye IIH.    Admitted to neurology primary service      ED Course as of 12/03/24 1611   Tue Dec 03, 2024   1038 19-year-old female history of migraines sees primary care and neurologist outpatient, and is coming in today for intermittent migraine like headache for the last 2 weeks.  Was asked by neurologist and primary care to come in for evaluation given some vision changes.   1040 States that she has been having some intermittent headache for the last 2 weeks, associated with left-sided vision changes, states that sometimes she feels like she is wearing sunglasses, no vision loss, no blurry vision.  Denies thunderclap headache, no trauma, no neurologic deficit indicated after waking again, no neck pain, no fever.   1040 No history of clots in the past.  On exam patient is neurologically intact, extra ocular movements intact.  Will give a migraine cocktail, check visual acuity, since she has been sent here from outpatient neurology will reach out to them.  At this point I do not see an indication for imaging, at this point low concern for acute intra cranial pathology including cavernous venous thrombosis.   1132 Neurology consulted   1358 Per neuro AP, Dr. Cummings would like to examine the pt, he is on his way from [place] now, thanks         Medications - No data to display    ED Risk Strat Scores                           SBIRT 22yo+      Flowsheet Row Most Recent Value   Initial Alcohol Screen: US AUDIT-C     1. How often do you have a drink containing alcohol? 0 Filed at: 12/03/2024 1005   2. How many drinks containing alcohol do you have on a typical day you are drinking?  0 Filed at: 12/03/2024 1005   3a. Male UNDER 65: How often do you have five or more drinks  "on one occasion? 0 Filed at: 12/03/2024 1005   3b. FEMALE Any Age, or MALE 65+: How often do you have 4 or more drinks on one occassion? 0 Filed at: 12/03/2024 1005   Audit-C Score 0 Filed at: 12/03/2024 1005   ROBYN: How many times in the past year have you...    Used an illegal drug or used a prescription medication for non-medical reasons? Never Filed at: 12/03/2024 1005                            History of Present Illness       Chief Complaint   Patient presents with   • Migraine     Migraine x 1 wk that caused L eye blurriness \"Like there is a sunglass over L eye\" that has been coming and going for the past week. Reports Nausea but no vomiting. Has a hx migraines but never one this bad.       Past Medical History:   Diagnosis Date   • Migraines       Past Surgical History:   Procedure Laterality Date   • WISDOM TOOTH EXTRACTION        Family History   Problem Relation Age of Onset   • Migraines Mother    • Depression Father    • Anxiety disorder Father    • Anxiety disorder Sister    • Depression Sister    • No Known Problems Brother    • No Known Problems Brother    • No Known Problems Brother    • Cancer Maternal Grandmother    • Thyroid disease Maternal Grandmother    • Uterine cancer Maternal Grandmother    • Heart disease Maternal Grandmother    • No Known Problems Maternal Grandfather    • No Known Problems Paternal Grandmother    • No Known Problems Paternal Grandfather    • Uterine cancer Other    • Uterine cancer Other    • Mental illness Neg Hx    • Substance Abuse Neg Hx       Social History     Tobacco Use   • Smoking status: Never     Passive exposure: Never   • Smokeless tobacco: Never   Vaping Use   • Vaping status: Never Used   Substance Use Topics   • Alcohol use: Never   • Drug use: Never      E-Cigarette/Vaping   • E-Cigarette Use Never User       E-Cigarette/Vaping Substances   • Nicotine No    • THC No    • CBD No    • Flavoring No    • Other No    • Unknown No       I have reviewed and " agree with the history as documented.     HPI    Review of Systems        Objective       ED Triage Vitals [24 1004]   Temperature Pulse Blood Pressure Respirations SpO2 Patient Position - Orthostatic VS   97.7 °F (36.5 °C) 91 123/75 16 98 % Lying      Temp src Heart Rate Source BP Location FiO2 (%) Pain Score    -- -- Right arm -- 3      Vitals      Date and Time Temp Pulse SpO2 Resp BP Pain Score FACES Pain Rating User   24 1004 97.7 °F (36.5 °C) 91 98 % 16 123/75 3 -- EM            Physical Exam    Results Reviewed       None            No orders to display       Procedures    ED Medication and Procedure Management   Prior to Admission Medications   Prescriptions Last Dose Informant Patient Reported? Taking?   ALPRAZolam (XANAX) 0.25 mg tablet   No No   Sig: Take 1 tablet (0.25 mg total) by mouth if needed for anxiety   Patient not taking: Reported on 2024   Ubrogepant (UBRELVY) 50 MG tablet   No No   Sig: Take 1 tablet (50 mg) one time as needed for migraine. May repeat one additional tablet (50 mg) at least two hours after the first dose. Do not use more than two doses per day, or for more than eight days per month.   amitriptyline (ELAVIL) 25 mg tablet   No No   Sig: TAKE 1 TABLET BY MOUTH DAILY AT BEDTIME   ketoconazole (NIZORAL) 2 % shampoo   No No   Sig: Apply 1 application topically once for 1 dose SHMP 2x/wk x 8 wk, then as needed.  Leave on 5 min then rinse.   Patient not taking: Reported on 2024   levonorgestrel (KYLEENA) 19.5 MG intrauterine device   Yes No   Si each by Intrauterine route once   Patient not taking: Reported on 2024   miSOPROStol (Cytotec) 200 mcg tablet   No No   Sig: Please take 3 tablets together orally the night before IUD insertion, if the insertion is scheduled for the afternoon session please take the Cytotec the morning of the procedure.   nystatin-triamcinolone (MYCOLOG-II) ointment   No No   Sig: Apply topically 2 (two) times a day   Patient  not taking: Reported on 11/19/2024   propranolol (INDERAL) 10 mg tablet   No No   Sig: TAKE 1 TABLET BY MOUTH TWICE A DAY   Patient not taking: Reported on 11/19/2024      Facility-Administered Medications: None     Patient's Medications   Discharge Prescriptions    No medications on file     No discharge procedures on file.  ED SEPSIS DOCUMENTATION            Yenni Aguirre DO  12/03/24 9166

## 2024-12-03 NOTE — CONSULTS
Consultation - Neurology   Name: Bacilio Salinas 19 y.o. adult I MRN: [id number]  Unit/Bed#: ED 06 I Date of Admission: 12/3/2024   Date of Service: 12/3/2024 I Hospital Day: 0     Inpatient consult to Neurology  Consult performed by: NIKHIL Dove  Consult ordered by: Felix Weir MD        Physician Requesting Evaluation: Felix Weir MD   Reason for Evaluation / Principal Problem: Headache    Assessment & Plan  Headache  19-year-old female with migraines, anxiety, who presented to the ED on 12/3 after being referred by her PCP and outpatient neurology office for complaints of headache and vision changes.  BP on presentation 123/75. Per discussion with attending neurologist, no papilledema on exam but cannot fully rule out IIH. Workup as noted below.    Plan:  -MRI brain  -Pending MRI brain, may need LP with opening/closing pressure  -Headache management:  -S/p Tylenol 650 mg, Toradol 15 mg, mag sulfate 2 g, Reglan 10 mg, and IV fluids in ER  -Increase amitriptyline to 40 mg HS  -Toradol 15 mg Q6H PRN, Reglan 10 mg Q6H PRN, Sumatriptan 100 mg once PRN  -Monitor neuroexam; notify with any changes  -Medical management and supportive care per primary team. Correction of any metabolic or infectious disturbances.   -Case and treatment plan reviewed with attending neurologist, Dr. Dominique. Please see attending attestation for any further recommendations.    Recommendations for outpatient neurological follow up have yet to be determined. Patient currently has appointment scheduled with Dr. Andino on 1/16/2025.    History of Present Illness   Sativa Rita is a 19 y.o.  adult with migraines, anxiety, who presents with headache and vision changes.    Patient was seen and evaluated.  She reports that she has been having left eye vision changes associated with her migraine headaches within the last week.  She states that she usually gets migraines 3 times a week at least.  The shortest duration of her headaches lasts  approximately 2 days.  Headache usually consists of pressure or stabbing behind her eyes and shooting pain up her neck.  She does have nausea and can have vomiting when it is bad.  She endorses light and sound sensitivity along with lightheadedness.  She is on amitriptyline and Ubrelvy at home.  She reports the Ubrelvy was more helpful when she first started it but recently it has not been helping as much.  She had been previously on propranolol before but this did not help her headaches.  2 weeks ago, she felt like her eyes jolted from side-to-side for approximately 3 seconds befor resolving and it has not since recurred.  She reports the vision loss is in her left eye and usually starts as seeing dark spots and then can progress to darkened vision like she has a sunglasses over that eye.  The vision changes occurs when her headache is at its peak, usually when it is 10/10.  As the pain gets better, she feels that her vision improves.  Prior to coming to the ED, her headache was a 4/10 and after receiving the migraine cocktail in the ER, her headache is now 1/10.  She has not had any vision changes today.  Last night her headache was severe and she had vision changes at that time along with an episode of vomiting.  She called the outpatient neurology office today and was told to come to the ED to get evaluated.    Per chart review, patient has longstanding history of headaches/migraines in the past.  She had previously been seen by [facility] neurology for this.  Her last visit was in 04/2019 with Dr. Huddleston.  At that time it was noted that patient had been having headaches for at least 8 years.  She had reported at that time that the pain was located in the front inside bilaterally and felt like throbbing/thumping [place] nausea, dizziness, light sensitivity.  Alleviating factors were Excedrin Migraine and sleep.  She was recommended for sleep study as she did report that she had snoring with sleeping.  Most recently,  patient's migraines are being managed by her PCP outpatient.  During her outpatient follow-up in 08/2024 with PCP, I noted the patient's headaches had improved with Elavil 25 mg at bedtime and Ubrelvy 50 mg daily as needed.  Patient was referred to see neurology outpatient and has visit scheduled with Dr. Andino on 1/16/2025.  On 12/3, patient contacted her PCP and outpatient neurology office reporting migraine with visual changes.  She was referred to the ED for further evaluation.  BP on presentation 123/75.  Patient was given Tylenol 650 mg, Toradol 15 mg, mag sulfate 2 g, Reglan 10 mg, and IV fluids in the ER.  Neurology was consulted for further evaluation of headaches/vision changes.    Review of Systems   A 12 system ROS was completed. Other than the above mentioned complaints in the HPI and those commented on below, all remaining systems were negative.    I have reviewed the patient's PMH, PSH, Social History, Family History, Meds, and Allergies  Historical Information   Past Medical History:   Diagnosis Date    Migraines      Past Surgical History:   Procedure Laterality Date    WISDOM TOOTH EXTRACTION       Social History     Tobacco Use    Smoking status: Never     Passive exposure: Never    Smokeless tobacco: Never   Vaping Use    Vaping status: Never Used   Substance and Sexual Activity    Alcohol use: Never    Drug use: Never    Sexual activity: Yes     Partners: Male     Birth control/protection: I.U.D.     Comment: Kyleena [birth year]     E-Cigarette/Vaping    E-Cigarette Use Never User      E-Cigarette/Vaping Substances    Nicotine No     THC No     CBD No     Flavoring No     Other No     Unknown No      Family History   Problem Relation Age of Onset    Migraines Mother     Depression Father     Anxiety disorder Father     Anxiety disorder Sister     Depression Sister     No Known Problems Brother     No Known Problems Brother     No Known Problems Brother     Cancer Maternal Grandmother     Thyroid  disease Maternal Grandmother     Uterine cancer Maternal Grandmother     Heart disease Maternal Grandmother     No Known Problems Maternal Grandfather     No Known Problems Paternal Grandmother     No Known Problems Paternal Grandfather     Uterine cancer Other     Uterine cancer Other     Mental illness Neg Hx     Substance Abuse Neg Hx        Current Facility-Administered Medications:     amitriptyline (ELAVIL) tablet 40 mg, HS    ketorolac (TORADOL) injection 15 mg, Q6H PRN    metoclopramide (REGLAN) injection 10 mg, Q6H PRN    SUMAtriptan (IMITREX) tablet 100 mg, Once PRN  Prior to Admission Medications   Prescriptions Last Dose Informant Patient Reported? Taking?   ALPRAZolam (XANAX) 0.25 mg tablet   No No   Sig: Take 1 tablet (0.25 mg total) by mouth if needed for anxiety   Patient not taking: Reported on 2024   Ubrogepant (UBRELVY) 50 MG tablet   No No   Sig: Take 1 tablet (50 mg) one time as needed for migraine. May repeat one additional tablet (50 mg) at least two hours after the first dose. Do not use more than two doses per day, or for more than eight days per month.   amitriptyline (ELAVIL) 25 mg tablet   No No   Sig: TAKE 1 TABLET BY MOUTH DAILY AT BEDTIME   ketoconazole (NIZORAL) 2 % shampoo   No No   Sig: Apply 1 application topically once for 1 dose SHMP 2x/wk x 8 wk, then as needed.  Leave on 5 min then rinse.   Patient not taking: Reported on 2024   levonorgestrel (KYLEENA) 19.5 MG intrauterine device   Yes No   Si each by Intrauterine route once   Patient not taking: Reported on 2024   miSOPROStol (Cytotec) 200 mcg tablet   No No   Sig: Please take 3 tablets together orally the night before IUD insertion, if the insertion is scheduled for the afternoon session please take the Cytotec the morning of the procedure.   nystatin-triamcinolone (MYCOLOG-II) ointment   No No   Sig: Apply topically 2 (two) times a day   Patient not taking: Reported on 2024   propranolol  (INDERAL) 10 mg tablet   No No   Sig: TAKE 1 TABLET BY MOUTH TWICE A DAY   Patient not taking: Reported on 11/19/2024      Facility-Administered Medications: None     Patient has no known allergies.    Objective :  Temp:  [97.7 °F (36.5 °C)] 97.7 °F (36.5 °C)  HR:  [85-91] 85  BP: ()/(58-75) 99/58  Resp:  [16] 16  SpO2:  [98 %] 98 %  O2 Device: None (Room air)    Physical Exam  Vitals and nursing note reviewed.   Constitutional:       General: She is not in acute distress.     Appearance: Normal appearance. She is not ill-appearing.   HENT:      Head: Normocephalic.      Mouth/Throat:      Mouth: Mucous membranes are moist.      Pharynx: Oropharynx is clear.   Eyes:      General: No scleral icterus.        Right eye: No discharge.         Left eye: No discharge.      Extraocular Movements: Extraocular movements intact.      Conjunctiva/sclera: Conjunctivae normal.   Cardiovascular:      Rate and Rhythm: Normal rate.   Pulmonary:      Effort: Pulmonary effort is normal. No respiratory distress.   Musculoskeletal:         General: Normal range of motion.      Cervical back: Normal range of motion.   Skin:     General: Skin is warm and dry.      Coloration: Skin is not jaundiced or pale.   Neurological:      Mental Status: She is oriented to person, place, and time.      Motor: Motor strength is normal.  Psychiatric:         Mood and Affect: Mood normal.         Speech: Speech normal.       Neurological Exam  Mental Status  Awake, alert and oriented to person, place and time. Oriented to person, place, time and situation. Oriented to person, place, and time. Speech is normal. Language is fluent with no aphasia. Attention and concentration are normal.    Cranial Nerves  CN II: Visual fields full to confrontation.  CN III, IV, VI: Extraocular movements intact bilaterally.  CN V: Facial sensation is normal.  CN VII: Full and symmetric facial movement.  CN VIII: Hearing is normal.  CN IX, X: Palate elevates  "symmetrically  CN XI: Shoulder shrug strength is normal.  CN XII: Tongue midline without atrophy or fasciculations.    Motor  Normal muscle bulk throughout. Strength is 5/5 throughout all four extremities.    Sensory  Light touch is normal in upper and lower extremities.     Reflexes  Right Plantar: downgoing  Left Plantar: downgoing    Coordination  Right: Finger-to-nose normal. Heel-to-shin normal.Left: Finger-to-nose normal. Heel-to-shin normal.        Lab Results: I have reviewed the following results:I have personally reviewed pertinent reports.  , CBC:   , BMP/CMP:       Invalid input(s): \"ALBUMIN\", Vitamin B12:   , HgBA1C:   , TSH:   , Coagulation:   , Lipid Profile:   , Ammonia:   , Urinalysis:       Invalid input(s): \"URIBILINOGEN\", Drug Screen:   , Medication Drug Levels:       Invalid input(s): \"CARBAMAZEPINE\", \"OXCARBAZEPINE\"  No results for input(s): \"WBC\", \"HGB\", \"HCT\", \"PLT\", \"BANDSPCT\", \"SODIUM\", \"K\", \"CL\", \"CO2\", \"BUN\", \"CREATININE\", \"GLUC\", \"CAIONIZED\", \"MG\", \"PHOS\" in the last 72 hours.    Imaging Results Review: No pertinent imaging studies reviewed.  Other Study Results Review: No additional pertinent studies reviewed.    VTE Prophylaxis: Reason for no pharmacologic prophylaxis currently in ED  "

## 2024-12-03 NOTE — ASSESSMENT & PLAN NOTE
19-year-old female with migraines, anxiety, who presented to the ED on 12/3 after being referred by her PCP and outpatient neurology office for complaints of headache and vision changes.  BP on presentation 123/75. Per discussion with attending neurologist, no papilledema on exam but cannot fully rule out IIH. Workup as noted below.    Plan:  -MRI brain  -Pending MRI brain, may need LP with opening/closing pressure  -Headache management:  -S/p Tylenol 650 mg, Toradol 15 mg, mag sulfate 2 g, Reglan 10 mg, and IV fluids in ER  -Increase amitriptyline to 40 mg HS  -Toradol 15 mg Q6H PRN, Reglan 10 mg Q6H PRN, Sumatriptan 100 mg once PRN  -Monitor neuroexam; notify with any changes  -Medical management and supportive care per primary team. Correction of any metabolic or infectious disturbances.   -Case and treatment plan reviewed with attending neurologist, Dr. Dominique. Please see attending attestation for any further recommendations.

## 2024-12-03 NOTE — PLAN OF CARE
Problem: PAIN - ADULT  Goal: Verbalizes/displays adequate comfort level or baseline comfort level  Description: Interventions:  - Encourage patient to monitor pain and request assistance  - Assess pain using appropriate pain scale  - Administer analgesics based on type and severity of pain and evaluate response  - Implement non-pharmacological measures as appropriate and evaluate response  - Consider cultural and social influences on pain and pain management  - Notify physician/advanced practitioner if interventions unsuccessful or patient reports new pain  Outcome: Progressing     Problem: INFECTION - ADULT  Goal: Absence or prevention of progression during hospitalization  Description: INTERVENTIONS:  - Assess and monitor for signs and symptoms of infection  - Monitor lab/diagnostic results  - Monitor all insertion sites, i.e. indwelling lines, tubes, and drains  - Monitor endotracheal if appropriate and nasal secretions for changes in amount and color  - Peytona appropriate cooling/warming therapies per order  - Administer medications as ordered  - Instruct and encourage patient and family to use good hand hygiene technique  - Identify and instruct in appropriate isolation precautions for identified infection/condition  Outcome: Progressing     Problem: NEUROSENSORY - ADULT  Goal: Achieves stable or improved neurological status  Description: INTERVENTIONS  - Monitor and report changes in neurological status  - Monitor vital signs such as temperature, blood pressure, glucose, and any other labs ordered   - Initiate measures to prevent increased intracranial pressure  - Monitor for seizure activity and implement precautions if appropriate      Outcome: Progressing     Problem: Knowledge Deficit  Goal: Patient/family/caregiver demonstrates understanding of disease process, treatment plan, medications, and discharge instructions  Description: Complete learning assessment and assess knowledge  base.  Interventions:  - Provide teaching at level of understanding  - Provide teaching via preferred learning methods  Outcome: Progressing     Problem: DISCHARGE PLANNING  Goal: Discharge to home or other facility with appropriate resources  Description: INTERVENTIONS:  - Identify barriers to discharge w/patient and caregiver  - Arrange for needed discharge resources and transportation as appropriate  - Identify discharge learning needs (meds, wound care, etc.)  - Arrange for interpretive services to assist at discharge as needed  - Refer to Case Management Department for coordinating discharge planning if the patient needs post-hospital services based on physician/advanced practitioner order or complex needs related to functional status, cognitive ability, or social support system  Outcome: Progressing

## 2024-12-03 NOTE — TELEPHONE ENCOUNTER
"Warm transfer from [place]    Patient has never been seen by adult neurology; last visit was Peds Neuro 4/18/2019; patient's PCP prescribed ubrelvy, patient reports does not always work.    Recommended she call PCP to discuss symptoms; if vision disturbance persists, go to ED for evaluation.    Unable to provide medical advice as not seen by adult neurology yet and last visit with peds neuro was 5 years ago.    Visit is already scheduled with Dr. Andino on 1/16 and added to wait list; originally was scheduled with Emilia Mcclain, who recommended patient should be scheduled with Dr. Andino.    Reason for Disposition   Loss of vision or double vision (Exception: Same as previously diagnosed migraines.)    Answer Assessment - Initial Assessment Questions  1. LOCATION: \"Where does it hurt?\"       Head/eyes  2. ONSET: \"When did the headache start?\" (e.g., minutes, hours, days)       Worsened recently  3. PATTERN: \"Does the pain come and go, or has it been constant since it started?\"      chronic  4. SEVERITY: \"How bad is the pain?\" and \"What does it keep you from doing?\"  (e.g., Scale 1-10; mild, moderate, or severe)      Last night was 8/10; current 5/10  5. RECURRENT SYMPTOM: \"Have you ever had headaches before?\" If Yes, ask: \"When was the last time?\" and \"What happened that time?\"       Recurrent, saw PCP who prescribed ubrelvy; referred to neurology  6. CAUSE: \"What do you think is causing the headache?\"      unknokwn  7. MIGRAINE: \"Have you been diagnosed with migraine headaches?\" If Yes, ask: \"Is this headache similar?\"       Was seen in neuro pediatrics in the past for headaches  8. HEAD INJURY: \"Has there been any recent injury to the head?\"       Did not discuss  9. OTHER SYMPTOMS: \"Do you have any other symptoms?\" (e.g., fever, stiff neck, eye pain, sore throat, cold symptoms)      \"Losing eyesight with migraines\", describes as dim and blurry vision at times which is new symptom for her; said had eye exam a few months " "ago however vision is progressively worsening and she is unsure if it is associated with migraines/headaches.  10. PREGNANCY: \"Is there any chance you are pregnant?\" \"When was your last menstrual period?\"        CTS did not discuss    Protocols used: Headache-Adult-[place]    "

## 2024-12-03 NOTE — TELEPHONE ENCOUNTER
Agree with reaching out to PCP or going to ER if significant/concerning symptoms.  I have not evaluated this patient yet, so I am unable to comment at this time.

## 2024-12-03 NOTE — ASSESSMENT & PLAN NOTE
-On amitriptyline 25 mg at bedtime at baseline; increased to 40 mg at bedtime this admission  -Managed outpatient by PCP

## 2024-12-03 NOTE — ED ATTENDING ATTESTATION
12/3/2024  I, Felix Weir MD, saw and evaluated the patient. I have discussed the patient with the resident/non-physician practitioner and agree with the resident's/non-physician practitioner's findings, Plan of Care, and MDM as documented in the resident's/non-physician practitioner's note, except where noted. All available labs and Radiology studies were reviewed.  I was present for key portions of any procedure(s) performed by the resident/non-physician practitioner and I was immediately available to provide assistance.       At this point I agree with the current assessment done in the Emergency Department.  I have conducted an independent evaluation of this patient a history and physical is as follows:    ED Course         Critical Care Time  Procedures    18 yo female with hx of migraines, follows with neurology outpatient and today noted blurry vision bilaterally and so called and told to come in.  Ptw with nausea, no vomiting. No relief with home meds.  No weakness in extremities, no double vision.  Vss, afebrile, lungs cta, rrr, abdomen soft nontender, no neuro deficits.  Migraine meds, neuro consult.

## 2024-12-03 NOTE — TELEPHONE ENCOUNTER
The patient called she has a migraine  she is also starting to have visual changes with them   currently she does not have vision problems   she called neurology she has a new patient appointment in January   they recommended the er   the patient called and wanted Dr Parks advise    I called over to the clinical department at the office and was told it would be best if she went to the er where they can give her medication to help with the migraine     the patient is going to the er

## 2024-12-03 NOTE — ASSESSMENT & PLAN NOTE
19-year-old female with migraines, anxiety, who presented to the ED on 12/3 after being referred by her PCP and outpatient neurology office for complaints of headache and vision changes.  BP on presentation 123/75. Per exam by Dr. Dominique, no papilledema seen. Due to no abnormal findings on MRI brain as well as clinical findings, suspicion thankfully lowered for IIH.    Plan:  -Headache management:  -S/p Tylenol 650 mg, Toradol 15 mg, mag sulfate 2 g, Reglan 10 mg, and IV fluids in ER  -Increase home amitriptyline to 50 mg HS  -Will prescribe sumatriptan 100mg prn for trial upon discharge as it was found to alleviate her headache when administered in the ED yesterday.  -After a few weeks trial of sumatriptan effectiveness in aborting HA, may return to ubrelvy if associated w/ greater control of HA, reported to be 50% effective in near resolution of sxs.  -Rec soon appt w/ ophthalmology.  -To consider LP in o/p setting, pt desires to leave after resolution of HA I/p.  -Case and treatment plan reviewed with attending neurologist, Dr. Munoz.  -Please see attending attestation for any further recommendations.

## 2024-12-03 NOTE — H&P
"  NEUROLOGY - ADMISSION H&P NOTE     Name: Bacilio Salinas   Age & Sex: 19 y.o. adult   MRN: [id number]  Unit/Bed#: ED 06   Encounter: [id number]    ASSESSMENT & PLAN     * Headache  Assessment & Plan  19-year-old female with migraines, anxiety, who presented to the ED on 12/3 after being referred by her PCP and outpatient neurology office for complaints of headache and vision changes.  BP on presentation 123/75. Per discussion with attending neurologist, no papilledema on exam but cannot fully rule out IIH. Workup as noted below.    Plan:  -MRI brain  -Pending MRI brain, may need LP with opening/closing pressure  -Headache management:  -S/p Tylenol 650 mg, Toradol 15 mg, mag sulfate 2 g, Reglan 10 mg, and IV fluids in ER  -Increase home amitriptyline to 40 mg HS  -Toradol 15 mg Q6H PRN, Reglan 10 mg Q6H PRN, Sumatriptan 100 mg once PRN  -Monitor neuroexam; notify with any changes  -Supportive care  -Correction of any metabolic or infectious disturbances.   -Case and treatment plan reviewed with attending neurologist, Dr. Dominique. Please see attending attestation for any further recommendations.    Generalized anxiety disorder  Assessment & Plan  -On amitriptyline 25 mg at bedtime at baseline; increased to 40 mg at bedtime this admission  -Managed outpatient by PCP      Recommendations for outpatient neurological follow up have yet to be determined.  Patient currently has appointment scheduled with Dr. Andino on 1/16/2025.    Pending for discharge: MRI, possibly LP    VTE Prophylaxis: sequential compression device   Code Status: Full code  POLST: POLST is not applicable to this patient    Anticipated Length of Stay:  Patient will be admitted on an Emergency basis with an anticipated length of stay of  2 midnights.     Justification for Hospital Stay: MRI, LP, headache workup    CHIEF COMPLAINT     Chief Complaint   Patient presents with    Migraine     Migraine x 1 wk that caused L eye blurriness \"Like there is a " "sunglass over L eye\" that has been coming and going for the past week. Reports Nausea but no vomiting. Has a hx migraines but never one this bad.        HISTORY OF PRESENT ILLNESS     Bacilio Salinas is a 19 y.o.  adult with migraines, anxiety, who presents with headache and vision changes.     Patient was seen and evaluated.  She reports that she has been having left eye vision changes associated with her migraine headaches within the last week.  She states that she usually gets migraines 3 times a week at least.  The shortest duration of her headaches lasts approximately 2 days.  Headache usually consists of pressure or stabbing behind her eyes and shooting pain up her neck.  She does have nausea and can have vomiting when it is bad.  She endorses light and sound sensitivity along with lightheadedness.  She is on amitriptyline and Ubrelvy at home.  She reports the Ubrelvy was more helpful when she first started it but recently it has not been helping as much.  She had been previously on propranolol before but this did not help her headaches.  2 weeks ago, she felt like her eyes jolted from side-to-side for approximately 3 seconds befor resolving and it has not since recurred.  She reports the vision loss is in her left eye and usually starts as seeing dark spots and then can progress to darkened vision like she has a sunglasses over that eye.  The vision changes occurs when her headache is at its peak, usually when it is 10/10.  As the pain gets better, she feels that her vision improves.  Prior to coming to the ED, her headache was a 4/10 and after receiving the migraine cocktail in the ER, her headache is now 1/10.  She has not had any vision changes today.  Last night her headache was severe and she had vision changes at that time along with an episode of vomiting.  She called the outpatient neurology office today and was told to come to the ED to get evaluated.     Per chart review, patient has longstanding " history of headaches/migraines in the past.  She had previously been seen by [facility] neurology for this.  Her last visit was in 04/2019 with Dr. Huddleston.  At that time it was noted that patient had been having headaches for at least 8 years.  She had reported at that time that the pain was located in the front inside bilaterally and felt like throbbing/thumping [place] nausea, dizziness, light sensitivity.  Alleviating factors were Excedrin Migraine and sleep.  She was recommended for sleep study as she did report that she had snoring with sleeping.  Most recently, patient's migraines are being managed by her PCP outpatient.  During her outpatient follow-up in 08/2024 with PCP, I noted the patient's headaches had improved with Elavil 25 mg at bedtime and Ubrelvy 50 mg daily as needed.  Patient was referred to see neurology outpatient and has visit scheduled with Dr. Andino on 1/16/2025.  On 12/3, patient contacted her PCP and outpatient neurology office reporting migraine with visual changes.  She was referred to the ED for further evaluation.  BP on presentation 123/75.  Patient was given Tylenol 650 mg, Toradol 15 mg, mag sulfate 2 g, Reglan 10 mg, and IV fluids in the ER.  Neurology was consulted for further evaluation of headaches/vision changes.    REVIEW OF SYSTEMS     Review of Systems  A 12 system ROS was completed. Other than the above mentioned complaints in the HPI and those commented on below, all remaining systems were negative.    PAST MEDICAL HISTORY     Past Medical History:   Diagnosis Date    Migraines        Allergies:   No Known Allergies    PAST SURGICAL HISTORY     Past Surgical History:   Procedure Laterality Date    WISDOM TOOTH EXTRACTION         SOCIAL & FAMILY HISTORY     Social History     Substance and Sexual Activity   Alcohol Use Never       Social History     Substance and Sexual Activity   Drug Use Never     Social History     Tobacco Use   Smoking Status Never    Passive exposure: Never    Smokeless Tobacco Never         Family History:  Family History   Problem Relation Age of Onset    Migraines Mother     Depression Father     Anxiety disorder Father     Anxiety disorder Sister     Depression Sister     No Known Problems Brother     No Known Problems Brother     No Known Problems Brother     Cancer Maternal Grandmother     Thyroid disease Maternal Grandmother     Uterine cancer Maternal Grandmother     Heart disease Maternal Grandmother     No Known Problems Maternal Grandfather     No Known Problems Paternal Grandmother     No Known Problems Paternal Grandfather     Uterine cancer Other     Uterine cancer Other     Mental illness Neg Hx     Substance Abuse Neg Hx            OBJECTIVE     Vitals:    24 1004 24 1223   BP: 123/75 99/58   BP Location: Right arm Right arm   Pulse: 91 85   Resp: 16 16   Temp: 97.7 °F (36.5 °C)    SpO2: 98% 98%        Temperature:   Temp (24hrs), Av.7 °F (36.5 °C), Min:97.7 °F (36.5 °C), Max:97.7 °F (36.5 °C)    Temperature: 97.7 °F (36.5 °C)    Intake & Output:  I/O          07 07 0701   07 0701   0700    IV Piggyback   1048.3    Total Intake   1048.3    Net   +1048.3                   Weights:        There is no height or weight on file to calculate BMI.  Weight (last 2 days)       None            Physical Exam:   Vital signs and nursing notes reviewed.     Constitutional: Appears comfortable. Well developed/well nourished. No diaphoresis. No acute distress.   HENT: Normocephalic, atraumatic. B/L external ears and nose appears normal. Oropharynx clear and moist.   Eyes: EOMs intact without nystagmus. No scleral icterus or injection. No discharge.   Neck: Supple, normal ROM. No stridor noted.   Cardiovascular: Regular rate.   Pulmonary: No respiratory distress. Effort normal. No stridor or wheezing evident.   Musculoskeletal: Normal ROM. No tenderness, edema, or deformities noted.  Neurological: Detailed below.  "  Skin: Warm and dry. No erythema or rashes. No jaundice.  Psychiatric: Normal mood and affect, normal thought process/thought content. No hallucinations. Good insight.     Neurologic Exam:  Mental Status: Patient is awake and alert. Oriented x 3. Follows all commands without difficulty. No dysarthria. No aphasia.     Cranial Nerves: EOMs intact. PERRL. Face symmetric without weakness noted. Facial sensation intact bilaterally. Tongue midline. Shoulder shrug equal bilaterally. VF intact.     Motor: 5/5 strength throughout bilateral upper and lower extremities.     Sensation: Sensation to light touch intact throughout.     Coordination: Finger to nose and heel to shin testing normal.     No tremors noted.     Gait: Deferred       LABORATORY DATA     Labs: Results Review Statement: No pertinent imaging studies reviewed.           Invalid input(s): \"LABALBU\"                         Micro:  Lab Results   Component Value Date    URINECX >100,000 cfu/ml Escherichia coli (A) 08/19/2024    URINECX (A) 09/20/2023     10,000-19,000 cfu/ml Beta Hemolytic Streptococcus Group B    URINECX 50,000-59,000 cfu/ml 09/20/2023       IMAGING & DIAGNOSTIC TESTING     Radiology Results: Results Review Statement: No pertinent imaging studies reviewed.    MRI inpatient order    (Results Pending)   FL lumbar puncture    (Results Pending)       Other Diagnostic Testing: Results Review Statement: No pertinent imaging studies reviewed.    ACTIVE MEDICATIONS     Current Facility-Administered Medications:     amitriptyline (ELAVIL) tablet 40 mg, HS    ketorolac (TORADOL) injection 15 mg, Q6H PRN    metoclopramide (REGLAN) injection 10 mg, Q6H PRN      HOME MEDICATIONS     Prior to Admission medications    Medication Sig Start Date End Date Taking? Authorizing Provider   ALPRAZolam (XANAX) 0.25 mg tablet Take 1 tablet (0.25 mg total) by mouth if needed for anxiety  Patient not taking: Reported on 11/19/2024 6/28/23   Airam Rosa MD "   amitriptyline (ELAVIL) 25 mg tablet TAKE 1 TABLET BY MOUTH DAILY AT BEDTIME 9/23/24   Airam Rosa MD   ketoconazole (NIZORAL) 2 % shampoo Apply 1 application topically once for 1 dose SHMP 2x/wk x 8 wk, then as needed.  Leave on 5 min then rinse.  Patient not taking: Reported on 11/19/2024 9/13/22 1/19/23  Atul Costa MD   levonorgestrel (KYLEENA) 19.5 MG intrauterine device 1 each by Intrauterine route once  Patient not taking: Reported on 11/19/2024    Historical Provider, MD   miSOPROStol (Cytotec) 200 mcg tablet Please take 3 tablets together orally the night before IUD insertion, if the insertion is scheduled for the afternoon session please take the Cytotec the morning of the procedure. 9/17/24 9/30/24  Ag Vieira MD   nystatin-triamcinolone (MYCOLOG-II) ointment Apply topically 2 (two) times a day  Patient not taking: Reported on 11/19/2024 9/20/23   Viv Campbell PA-C   propranolol (INDERAL) 10 mg tablet TAKE 1 TABLET BY MOUTH TWICE A DAY  Patient not taking: Reported on 11/19/2024 10/17/23   Airam Rosa MD   Ubrogepant (UBRELVY) 50 MG tablet Take 1 tablet (50 mg) one time as needed for migraine. May repeat one additional tablet (50 mg) at least two hours after the first dose. Do not use more than two doses per day, or for more than eight days per month. 11/1/24   Airam Rosa MD     ACTIVE MEDICATIONS

## 2024-12-04 VITALS
BODY MASS INDEX: 20.66 KG/M2 | RESPIRATION RATE: 18 BRPM | HEART RATE: 76 BPM | DIASTOLIC BLOOD PRESSURE: 50 MMHG | SYSTOLIC BLOOD PRESSURE: 94 MMHG | WEIGHT: 124 LBS | HEIGHT: 65 IN | OXYGEN SATURATION: 97 % | TEMPERATURE: 97.2 F

## 2024-12-04 LAB
INR PPP: 1.25 (ref 0.85–1.19)
PLATELET # BLD AUTO: 285 THOUSANDS/UL (ref 149–390)
PMV BLD AUTO: 11.1 FL (ref 8.9–12.7)
PROTHROMBIN TIME: 15.9 SECONDS (ref 12.3–15)

## 2024-12-04 PROCEDURE — 85610 PROTHROMBIN TIME: CPT | Performed by: NURSE PRACTITIONER

## 2024-12-04 PROCEDURE — NC001 PR NO CHARGE: Performed by: PSYCHIATRY & NEUROLOGY

## 2024-12-04 PROCEDURE — 85049 AUTOMATED PLATELET COUNT: CPT | Performed by: NURSE PRACTITIONER

## 2024-12-04 PROCEDURE — 99238 HOSP IP/OBS DSCHRG MGMT 30/<: CPT | Performed by: PSYCHIATRY & NEUROLOGY

## 2024-12-04 RX ORDER — SUMATRIPTAN SUCCINATE 100 MG/1
TABLET ORAL
Qty: 20 TABLET | Refills: 0 | Status: SHIPPED | OUTPATIENT
Start: 2024-12-04 | End: 2024-12-09

## 2024-12-04 NOTE — UTILIZATION REVIEW
"Initial Clinical Review    Admission: Date/Time/Statement:   Admission Orders (From admission, onward)       Ordered        12/03/24 1557  INPATIENT ADMISSION  Once                          Orders Placed This Encounter   Procedures    INPATIENT ADMISSION     Standing Status:   Standing     Number of Occurrences:   1     Level of Care:   Med Surg [16]     Estimated length of stay:   More than 2 Midnights     Certification:   I certify that inpatient services are medically necessary for this patient for a duration of greater than two midnights. See H&P and MD Progress Notes for additional information about the patient's course of treatment.     ED Arrival Information       Expected   -    Arrival   12/3/2024 09:55    Acuity   Urgent              Means of arrival   Walk-In    Escorted by   Self    Service   Neurology    Admission type   Emergency              Arrival complaint   migraines/vision impairment             Chief Complaint   Patient presents with    Migraine     Migraine x 1 wk that caused L eye blurriness \"Like there is a sunglass over L eye\" that has been coming and going for the past week. Reports Nausea but no vomiting. Has a hx migraines but never one this bad.       12/03    Initial Presentation: 19 y.o. adult   with migraines, anxiety, who presented to the ED on 12/3 after being referred by her PCP and outpatient neurology office for complaints of headache and vision changes.  BP on presentation 123/75.    no papilledema on exam but cannot fully rule out IIH.   gCS 15     Admit IP status,  MS  Level of care for  neuro workup of HA - pain/nausea control.     Plan: MRI brain  (may need LP with opening/closing pressure)  HA Management :  Increase home amitriptyline to 40 mg HS,  Toradol 15 mg Q6H PRN, Reglan 10 mg Q6H PRN, Sumatriptan 100 mg once PRN  Serial neuroexams; Correction of any metabolic or infectious disturbances.   On amitriptyline 25 mg HS for anxiety -  increased to 40 mg HS     Anticipated " Length of Stay/Certification Statement:  Patient will be admitted on an Emergency basis with an anticipated length of stay of  2 midnights.     Justification for Hospital Stay: MRI, LP, headache workup    Date: 12/04   Day 2:   gCS consistently 15.   vision changes went away once her pain was alleviated.  Increase home amitriptyline to 50 mg HS  Will prescribe sumatriptan 100mg prn for trial upon discharge as it was found to alleviate her headache when administered in the ED yesterday.  After a few weeks trial of sumatriptan effectiveness in aborting HA, may return to ubrelvy if associated w/ greater control of HA, reported to be 50% effective in near resolution of sxs.  Rec soon appt w/ ophthalmology.      ED Treatment-Medication Administration from 12/03/2024 0955 to 12/03/2024 1702      Date/Time Order Dose Route Action   12/03/2024 1049 sodium chloride 0.9 % bolus 1,000 mL 1,000 mL Intravenous New Bag   12/03/2024 1047 ketorolac (TORADOL) injection 15 mg 15 mg Intravenous Given   12/03/2024 1047 acetaminophen (TYLENOL) tablet 650 mg 650 mg Oral Given   12/03/2024 1049 magnesium sulfate 2 g/50 mL IVPB (premix) 2 g 2 g Intravenous New Bag   12/03/2024 1049 metoclopramide (REGLAN) injection 10 mg 10 mg Intravenous Given   12/03/2024 1530 ketorolac (TORADOL) injection 15 mg 15 mg Intravenous Given   12/03/2024 1531 metoclopramide (REGLAN) injection 10 mg 10 mg Intravenous Given   12/03/2024 1531 SUMAtriptan (IMITREX) tablet 100 mg 100 mg Oral Given       Scheduled Medications:  amitriptyline, 40 mg, Oral, HS      Continuous IV Infusions:     PRN Meds:  ALPRAZolam, 0.25 mg, Oral, TID PRN  ketorolac, 15 mg, Intravenous, Q6H PRN  metoclopramide, 10 mg, Intravenous, Q6H PRN      ED Triage Vitals [12/03/24 1004]   Temperature Pulse Respirations Blood Pressure SpO2 Pain Score   97.7 °F (36.5 °C) 91 16 123/75 98 % 3     Weight (last 2 days)       Date/Time Weight    12/03/24 1706 56.2 (124)            Vital Signs (last 3  days)       Date/Time Temp Pulse Resp BP MAP (mmHg) SpO2 Pain    12/04/24 0852 -- -- -- -- -- -- No Pain    12/04/24 07:22:07 97.2 °F (36.2 °C) 76 18 94/50 65 97 % --    12/03/24 22:49:06 -- -- -- 116/81 93 -- --    12/03/24 22:18:15 97.6 °F (36.4 °C) 84 16 89/55 66 97 % --    12/03/24 18:49:59 97.7 °F (36.5 °C) 99 16 101/66 78 98 % --    12/03/24 1706 -- -- -- -- -- -- No Pain    12/03/24 1700 -- -- -- -- -- -- --    12/03/24 1530 -- -- -- -- -- -- 1    12/03/24 1223 -- 85 16 99/58 -- 98 % --    12/03/24 1117 -- -- -- -- -- -- 1    12/03/24 1054 -- -- -- -- -- -- --    12/03/24 1053 -- -- -- -- -- -- --    12/03/24 1047 -- -- -- -- -- -- 4    12/03/24 1004 97.7 °F (36.5 °C) 91 16 123/75 -- 98 % 3              Pertinent Labs/Diagnostic Test Results:   Radiology:  MRI brain wo contrast   Final Interpretation by Kevyn Galicia MD (12/03 2344)      Normal unenhanced MRI brain examination.      Workstation performed: [id number]         FL lumbar puncture    (Results Pending)     Cardiology:  No orders to display     GI:  No orders to display           Results from last 7 days   Lab Units 12/04/24  0524   PLATELETS Thousands/uL 285           Results from last 7 days   Lab Units 12/04/24 0524   PROTIME seconds 15.9*   INR  1.25*       Past Medical History:   Diagnosis Date    Migraines      Present on Admission:   Generalized anxiety disorder      Admitting Diagnosis: Migraine [G43.909]  Other headache syndrome [G44.89]  Intractable migraine without status migrainosus, unspecified migraine type [G43.919]  Age/Sex: 19 y.o. adult    Network Utilization Review Department  ATTENTION: Please call with any questions or concerns to [phone] and carefully listen to the prompts so that you are directed to the right person. All voicemails are confidential.   For Discharge needs, contact Care Management DC Support Team at [phone] opt. 2  Send all requests for admission clinical reviews, approved or denied determinations and  any other requests to dedicated fax number below belonging to the campus where the patient is receiving treatment. List of dedicated fax numbers for the Facilities:  FACILITY NAME UR FAX NUMBER   ADMISSION DENIALS (Administrative/Medical Necessity) [phone]   DISCHARGE SUPPORT TEAM (NETWORK) [phone]   PARENT CHILD HEALTH (Maternity/NICU/Pediatrics) [phone]   [facility] [phone]   [facility] [phone]   [facility] [phone]   [facility] [phone]   [facility] [phone]   [facility] [phone]   [facility] [phone]   [facility] [phone]   [facility] [phone]   [facility] [phone]   [facility] [phone]   [facility] [phone]

## 2024-12-04 NOTE — NURSING NOTE
D/C instructions reviewed with pt and pts mother. Verbalized understanding. IV removed. Pt has all belongings. Pt ambulated to the main lobby for d/c home with mother.

## 2024-12-04 NOTE — UTILIZATION REVIEW
"NOTIFICATION OF INPATIENT ADMISSION   AUTHORIZATION REQUEST   SERVICING FACILITY:   [facility]  Address: [address] [place] [address]  Tax ID: [id number]  NPI: [id number] ATTENDING PROVIDER:  Attending Name and NPI#: Louie Munoz Do [[id number]]  Address: [address] [place] [address]  Phone: [phone]   ADMISSION INFORMATION:  Place of Service: Inpatient [facility]  Place of Service Code: 21  Inpatient Admission Date/Time: 12/3/24  3:57 PM  Discharge Date/Time: 12/4/2024 12:40 PM  Admitting Diagnosis Code/Description:  Migraine [G43.909]  Other headache syndrome [G44.89]  Intractable migraine without status migrainosus, unspecified migraine type [G43.919]     UTILIZATION REVIEW CONTACT:  Shivani \"Diane\"Donnie Utilization   Network Utilization Review Department  Phone: [phone]  Fax: [phone]  Email: [email or web address]  Contact for approvals/pending authorizations, clinical reviews, and discharge.     PHYSICIAN ADVISORY SERVICES:  Medical Necessity Denial & Xceb-lr-Oifk Review  Phone: [phone]  Fax: [phone]  Email: [email or web address]     DISCHARGE SUPPORT TEAM:  For Patients Discharge Needs & Updates  Phone: [phone] opt. 2 Fax: [phone]  Email: [email or web address]     "

## 2024-12-04 NOTE — PLAN OF CARE
Problem: PAIN - ADULT  Goal: Verbalizes/displays adequate comfort level or baseline comfort level  Description: Interventions:  - Encourage patient to monitor pain and request assistance  - Assess pain using appropriate pain scale  - Administer analgesics based on type and severity of pain and evaluate response  - Implement non-pharmacological measures as appropriate and evaluate response  - Consider cultural and social influences on pain and pain management  - Notify physician/advanced practitioner if interventions unsuccessful or patient reports new pain  Outcome: Progressing     Problem: INFECTION - ADULT  Goal: Absence or prevention of progression during hospitalization  Description: INTERVENTIONS:  - Assess and monitor for signs and symptoms of infection  - Monitor lab/diagnostic results  - Monitor all insertion sites, i.e. indwelling lines, tubes, and drains  - Monitor endotracheal if appropriate and nasal secretions for changes in amount and color  - [place] appropriate cooling/warming therapies per order  - Administer medications as ordered  - Instruct and encourage patient and family to use good hand hygiene technique  - Identify and instruct in appropriate isolation precautions for identified infection/condition  Outcome: Progressing  Goal: Absence of fever/infection during neutropenic period  Description: INTERVENTIONS:  - Monitor WBC    Outcome: Progressing     Problem: SAFETY ADULT  Goal: Patient will remain free of falls  Description: INTERVENTIONS:  - Educate patient/family on patient safety including physical limitations  - Instruct patient to call for assistance with activity   - Consult OT/PT to assist with strengthening/mobility   - Keep Call bell within reach  - Keep bed low and locked with side rails adjusted as appropriate  - Keep care items and personal belongings within reach  - Initiate and maintain comfort rounds  - Make Fall Risk Sign visible to staff  - Offer Toileting every 2 Hours,  in advance of need  - Initiate/Maintain alarm  - Obtain necessary fall risk management equipment:   - Apply yellow socks and bracelet for high fall risk patients  - Consider moving patient to room near nurses station  Outcome: Progressing  Goal: Maintain or return to baseline ADL function  Description: INTERVENTIONS:  -  Assess patient's ability to carry out ADLs; assess patient's baseline for ADL function and identify physical deficits which impact ability to perform ADLs (bathing, care of mouth/teeth, toileting, grooming, dressing, etc.)  - Assess/evaluate cause of self-care deficits   - Assess range of motion  - Assess patient's mobility; develop plan if impaired  - Assess patient's need for assistive devices and provide as appropriate  - Encourage maximum independence but intervene and supervise when necessary  - Involve family in performance of ADLs  - Assess for home care needs following discharge   - Consider OT consult to assist with ADL evaluation and planning for discharge  - Provide patient education as appropriate  Outcome: Progressing  Goal: Maintains/Returns to pre admission functional level  Description: INTERVENTIONS:  - Perform AM-PAC 6 Click Basic Mobility/ Daily Activity assessment daily.  - Set and communicate daily mobility goal to care team and patient/family/caregiver.   - Collaborate with rehabilitation services on mobility goals if consulted  - Perform Range of Motion 3 times a day.  - Reposition patient every 2 hours.  - Dangle patient 3 times a day  - Stand patient 3 times a day  - Ambulate patient 3 times a day  - Out of bed to chair 3 times a day   - Out of bed for meals 3 times a day  - Out of bed for toileting  - Record patient progress and toleration of activity level   Outcome: Progressing     Problem: DISCHARGE PLANNING  Goal: Discharge to home or other facility with appropriate resources  Description: INTERVENTIONS:  - Identify barriers to discharge w/patient and caregiver  -  Arrange for needed discharge resources and transportation as appropriate  - Identify discharge learning needs (meds, wound care, etc.)  - Arrange for interpretive services to assist at discharge as needed  - Refer to Case Management Department for coordinating discharge planning if the patient needs post-hospital services based on physician/advanced practitioner order or complex needs related to functional status, cognitive ability, or social support system  Outcome: Progressing     Problem: Knowledge Deficit  Goal: Patient/family/caregiver demonstrates understanding of disease process, treatment plan, medications, and discharge instructions  Description: Complete learning assessment and assess knowledge base.  Interventions:  - Provide teaching at level of understanding  - Provide teaching via preferred learning methods  Outcome: Progressing     Problem: NEUROSENSORY - ADULT  Goal: Achieves stable or improved neurological status  Description: INTERVENTIONS  - Monitor and report changes in neurological status  - Monitor vital signs such as temperature, blood pressure, glucose, and any other labs ordered   - Initiate measures to prevent increased intracranial pressure  - Monitor for seizure activity and implement precautions if appropriate      Outcome: Progressing  Goal: Remains free of injury related to seizures activity  Description: INTERVENTIONS  - Maintain airway, patient safety  and administer oxygen as ordered  - Monitor patient for seizure activity, document and report duration and description of seizure to physician/advanced practitioner  - If seizure occurs,  ensure patient safety during seizure  - Reorient patient post seizure  - Seizure pads on all 4 side rails  - Instruct patient/family to notify RN of any seizure activity including if an aura is experienced  - Instruct patient/family to call for assistance with activity based on nursing assessment  - Administer anti-seizure medications if  ordered    Outcome: Progressing  Goal: Achieves maximal functionality and self care  Description: INTERVENTIONS  - Monitor swallowing and airway patency with patient fatigue and changes in neurological status  - Encourage and assist patient to increase activity and self care.   - Encourage visually impaired, hearing impaired and aphasic patients to use assistive/communication devices  Outcome: Progressing

## 2024-12-04 NOTE — DISCHARGE SUMMARY
Discharge Summary - Neurology   Name: Bacilio Salinas 19 y.o. adult I MRN: [id number]  Unit/Bed#: [facility] 628-01 I Date of Admission: 12/3/2024   Date of Service: 12/4/2024 I Hospital Day: 1        NEUROLOGY RESIDENCY - DISCHARGE SUMMARY     Name: Bacilio Salinas   Age & Sex: 19 y.o. adult   MRN: [id number]  Unit/Bed#: [facility] 628-01   Encounter: [id number]    Discharging Resident Physician: Amrik Feliz DO  Attending: Louie Munoz DO  PCP: Airam Rosa MD  Admission Date: 12/3/2024  Discharge Date: 12/04/24    Bacilio Salinas will need follow up in at the next regular appointment with headache specialist Dr. Andino .  She will not require outpatient neurological testing but may consider LP at visit w/ attending. No msg required, appt already set in 1 month.    ASSESSMENT & PLAN     * Headache  Assessment & Plan  19-year-old female with migraines, anxiety, who presented to the ED on 12/3 after being referred by her PCP and outpatient neurology office for complaints of headache and vision changes.  BP on presentation 123/75. Per exam by Dr. Dominique, no papilledema seen. Due to no abnormal findings on MRI brain as well as clinical findings, suspicion thankfully lowered for IIH. Pt had complete resolution of HA following morning & desired to be sent home now. Lumbar Puncture is not found to be necessary at this time in light of imaging & exam findings, but may still be considered in o/p setting, which pt states she will consider. Aware of o/p neuro appt w/ Dr. Andino [facility] specialist in 1 month, to attend & discuss further action at that time.    Plan:  -Increase home amitriptyline to 50 mg HS  -Sumatriptan 100mg prn for trial as it was found to alleviate her headache when administered in the ED yesterday.  -After a few weeks trial of sumatriptan effectiveness in aborting HA, may return to ubrelvy if associated w/ greater control of HA, reported to be 50% effective in near resolution of sxs.  -Rec soon appt w/  ophthalmology.  -To consider LP in o/p setting, pt desires to leave after resolution of HA I/p.  -Case and treatment plan reviewed with attending neurologist, Dr. Munoz.  -Please see attending attestation for any further recommendations.          Disposition:     Home    Reason for Admission: Headache    Consultations During Hospital Stay:  IP CONSULT TO NEUROLOGY    Procedures Performed:   None    Significant Findings / Test Results:   Labs: Hemoglobin A1c No results in last 5 years (No results in last 5 years), LDL No results in last 5 years (No results in last 5 years)    MRI brain wo contrast  Result Date: 12/3/2024  Impression: Normal unenhanced MRI brain examination. Workstation performed: [id number]       Incidental Findings:   None    Test Results Pending at Discharge (will require follow up):   None     Outpatient Tests Requested:  Will consider LP with outpatient headache specialist    Complications: None    Hospital Course:     Bacilio Salinas is a 19 y.o. adult patient who originally presented to the hospital on 12/3/2024 due to worsened headache and multiple episodes of transient visual changes described as blurriness/shadows/darkness without blindness to left eye only, these episodes only having occurred over the past few days to week, but in the setting of longstanding anxiety and migraines since young childhood.  Concern existed for IIH therefore patient was admitted to the hospital for further workup including MRI of the brain and the possibility of an LP depending on imaging results.  Patient was given migraine cocktail including magnesium, Toradol, Reglan, sumatriptan, and Tylenol, plus home medication of amitriptyline was increased from 25 to 40-50 mg, which patient was on for anxiety and migraine history.  MRI thankfully returned normal without any concerning signs for IIH.  It was decided that LP could be obtained in the outpatient setting if patient symptoms return or worsen and at the  discretion of outpatient headache specialist, patient has appointment scheduled in January.  At this time patient declined interest in LP while inpatient, especially after all symptoms had resolved following the migraine cocktail.  Patient is medically stable for discharge and no longer requiring hospital level of care, but should follow-up as mentioned above in 1 month with headache specialist Dr. Andino, continue on increased dose of amitriptyline 50 Mg at bedtime, trial sumatriptan 100 mg as a headache abortive in place of prior abortive Ubrelvy although may return to Ubrelvy after few weeks trial if it is determined that sumatriptan is not effective in controlling headache severity.    Condition at Discharge: good     Discharge Day Visit / Exam:     See same day H&P    Discharge instructions/Information to patient and family:   See after visit summary for information provided to patient and family.      Provisions for Follow-Up Care:  See after visit summary for information related to follow-up care and any pertinent home health orders.      Planned Readmission: No    Discharge Statement:  I spent 40 minutes discharging the patient. This time was spent on the day of discharge. I had direct contact with the patient on the day of discharge. Greater than 50% of the total time was spent examining patient, answering all patient questions, arranging and discussing plan of care with patient as well as directly providing post-discharge instructions.  Additional time then spent on discharge activities.      Discharge Medications:  See after visit summary for reconciled discharge medications provided to patient and family.      ** Please Note: This note has been constructed using a voice recognition system **      ======    I have discussed the patient's history, physical exam findings, assessment, and plan in detail with attending, Dr. Munoz    Thank you for allowing me to participate in the care of your patient,  Bacilio Salinas.    Amrik Feliz DO  [facility] Neurology Residency, PGY-2

## 2024-12-04 NOTE — ASSESSMENT & PLAN NOTE
19-year-old female with migraines, anxiety, who presented to the ED on 12/3 after being referred by her PCP and outpatient neurology office for complaints of headache and vision changes.  BP on presentation 123/75. Per exam by Dr. Dominique, no papilledema seen. Due to no abnormal findings on MRI brain as well as clinical findings, suspicion thankfully lowered for IIH. Pt had complete resolution of HA following morning & desired to be sent home now. Lumbar Puncture is not found to be necessary at this time in light of imaging & exam findings, but may still be considered in o/p setting, which pt states she will consider. Aware of o/p neuro appt w/ Dr. Andino [facility] specialist in 1 month, to attend & discuss further action at that time.    Plan:  -Increase home amitriptyline to 50 mg HS  -Sumatriptan 100mg prn for trial as it was found to alleviate her headache when administered in the ED yesterday.  -After a few weeks trial of sumatriptan effectiveness in aborting HA, may return to ubrelvy if associated w/ greater control of HA, reported to be 50% effective in near resolution of sxs.  -Rec soon appt w/ ophthalmology.  -To consider LP in o/p setting, pt desires to leave after resolution of HA I/p.  -Case and treatment plan reviewed with attending neurologist, Dr. Munoz.  -Please see attending attestation for any further recommendations.

## 2024-12-04 NOTE — PROGRESS NOTES
Progress Note - Neurology   Name: Bacilio Salinas 19 y.o. adult I MRN: [id number]  Unit/Bed#: [facility] 628-01 I Date of Admission: 12/3/2024   Date of Service: 12/4/2024 I Hospital Day: 1    Assessment & Plan  Headache  19-year-old female with migraines, anxiety, who presented to the ED on 12/3 after being referred by her PCP and outpatient neurology office for complaints of headache and vision changes.  BP on presentation 123/75. Per exam by Dr. Dominique, no papilledema seen. Due to no abnormal findings on MRI brain as well as clinical findings, suspicion thankfully lowered for IIH.    Plan:  -Headache management:  -S/p Tylenol 650 mg, Toradol 15 mg, mag sulfate 2 g, Reglan 10 mg, and IV fluids in ER  -Increase home amitriptyline to 50 mg HS  -Will prescribe sumatriptan 100mg prn for trial upon discharge as it was found to alleviate her headache when administered in the ED yesterday.  -After a few weeks trial of sumatriptan effectiveness in aborting HA, may return to ubrelvy if associated w/ greater control of HA, reported to be 50% effective in near resolution of sxs.  -Rec soon appt w/ ophthalmology.  -To consider LP in o/p setting, pt desires to leave after resolution of HA I/p.  -Case and treatment plan reviewed with attending neurologist, Dr. Munoz.  -Please see attending attestation for any further recommendations.    Bacilio Salinas will need follow up in at the next regular appointment with headache attending Dr. Andino. She will not require outpatient neurological testing.  I have discussed with Dr. Munoz the above plan to treat pt. He agrees with the plan.  Please contact the SecureChat role for the Neurology service with any questions/concerns.    Subjective   Patient is a 19-year-old female with migraines, anxiety, who presented to the ED on 12/3 after being referred by her PCP and outpatient neurology office for complaints of headache and vision changes. She reports experiencing migraines since she was  "10 years old, and notes a Fhx of migraines in her mother. She states her typical migraines happen 2-3 times per week and last an average of 1.5 days, with the pain being 8/10 off medication and 2-3/10 with proper medication. She denies any aura prior to migraine onset except one time when she experienced one while on estrogen OCP years ago, which was then discontinued. She reports that her migraines typically start as an achy pain in her neck or left eye and then spread around her head and become more of a sharp pain. She reports photophobia, phonophobia, osmophobia, and nausea with her typical migraines, and notes that this most recent migraine also had vision changes in her left eye where her vision became blurry and \"looked like a lens covered it.\" These vision changes went away once her pain was alleviated. She does note worsening pain when getting up, and states when laying down sometimes she feels worsening pain from what feels like a pinched nerve in her neck. She denies any weakness, numbness, or tingling anywhere else.     Patient reports taking Ubrelvy and amytriptiline regularly, but notes that the Ubrelvy does not seem to be working as well anymore and only seems to alleviate her headaches 50% of the time after 2 doses. She also takes Reglan as needed to alleviate her migraines. She states that Reglan and laying down in a dark, quiet room with an ice pack on her face helps alleviate her migraines, and also notes taking essential oils for nausea. She reports seeing an eye doctor regularly, and had a normal exam within the past year.     Review of Systems   Constitutional:  Negative for activity change, appetite change, fatigue and fever.   HENT: Negative.  Negative for hearing loss, tinnitus, trouble swallowing and voice change.    Eyes: Negative.  Negative for photophobia, pain and visual disturbance.   Respiratory: Negative.  Negative for cough and shortness of breath.    Cardiovascular: Negative.  " Negative for chest pain, palpitations and leg swelling.   Gastrointestinal: Negative.  Negative for nausea and vomiting.   Endocrine: Negative.  Negative for cold intolerance and heat intolerance.   Genitourinary: Negative.  Negative for dysuria, frequency and urgency.   Musculoskeletal:  Negative for back pain, gait problem, myalgias, neck pain and neck stiffness.   Skin: Negative.  Negative for rash.   Allergic/Immunologic: Negative.  Negative for environmental allergies, food allergies and immunocompromised state.   Neurological: Negative.  Negative for dizziness, tremors, seizures, syncope, facial asymmetry, speech difficulty, weakness, light-headedness, numbness and headaches.   Hematological: Negative.  Negative for adenopathy. Does not bruise/bleed easily.   Psychiatric/Behavioral: Negative.  Negative for confusion, hallucinations and sleep disturbance.    All other systems reviewed and are negative.    migraine headaches, vision changes resolved    Objective :  Temp:  [97.2 °F (36.2 °C)-97.7 °F (36.5 °C)] 97.2 °F (36.2 °C)  HR:  [76-99] 76  BP: ()/(50-81) 94/50  Resp:  [16-18] 18  SpO2:  [97 %-98 %] 97 %  O2 Device: None (Room air)    Physical Exam  Vitals and nursing note reviewed.   Constitutional:       Appearance: Normal appearance.   HENT:      Head: Normocephalic and atraumatic.      Right Ear: External ear normal.      Left Ear: External ear normal.      Nose: Nose normal.   Eyes:      General: Lids are normal.      Extraocular Movements: Extraocular movements intact.      Pupils: Pupils are equal, round, and reactive to light.   Pulmonary:      Effort: Pulmonary effort is normal.   Musculoskeletal:         General: Normal range of motion.      Cervical back: Normal range of motion.   Neurological:      General: No focal deficit present.      Mental Status: She is oriented to person, place, and time. Mental status is at baseline.      Cranial Nerves: No cranial nerve deficit.      Sensory: No  sensory deficit.      Motor: Motor strength is normal.No weakness.      Coordination: Coordination normal.      Gait: Gait normal.      Deep Tendon Reflexes: Reflexes are normal and symmetric. Reflexes normal.   Psychiatric:         Mood and Affect: Mood normal.         Speech: Speech normal.         Behavior: Behavior normal.         Thought Content: Thought content normal.         Judgment: Judgment normal.     Neurological Exam  Mental Status  Awake, alert and oriented to person, place and time. Oriented to person, place, time and situation. Oriented to person, place, and time. Recent and remote memory are intact. Speech is normal. Language is fluent with no aphasia. Attention and concentration are normal.    Cranial Nerves  CN I: Sense of smell is normal.  CN II: Visual acuity is normal. Visual fields full to confrontation.  CN III, IV, VI: Extraocular movements intact bilaterally. Normal lids and orbits bilaterally. Pupils equal round and reactive to light bilaterally.  CN V: Facial sensation is normal.  CN VII: Full and symmetric facial movement.  CN VIII: Hearing is normal.  CN IX, X: Palate elevates symmetrically. Normal gag reflex.  CN XI: Shoulder shrug strength is normal.  CN XII: Tongue midline without atrophy or fasciculations.    Motor  Normal muscle bulk throughout. No fasciculations present. Normal muscle tone. No abnormal involuntary movements. Strength is 5/5 throughout all four extremities.    Sensory  Sensation is intact to light touch, pinprick, vibration and proprioception in all four extremities.    Reflexes  Deep tendon reflexes are 2+ and symmetric in all four extremities.    Coordination  Right: Finger-to-nose normal.    Gait  Normal casual, toe, heel and tandem gait. Normal gait.        Lab Results: I have reviewed the following results:  Imaging Results Review: I personally reviewed the following image studies/reports in PACS and discussed pertinent findings with Radiology: MRI brain. My  interpretation of the radiology images/reports is: Normal unenhanced MRI brain examination..  Other Study Results Review: Other studies reviewed include: Platelet Count, Protime-INR     Latest Reference Range & Units 12/04/24 05:24   Platelet Count 149 - 390 Thousands/uL 285   MPV 8.9 - 12.7 fL 11.1      Latest Reference Range & Units 12/04/24 05:24   PROTIME 12.3 - 15.0 seconds 15.9 (H)   POCT INR 0.85 - 1.19  1.25 (H)   (H): Data is abnormally high    VTE Pharmacologic Prophylaxis: VTE covered by:    None       Administrative Statements   I have spent a total time of 50 minutes in caring for this patient on the day of the visit/encounter including Diagnostic results, Prognosis, Risks and benefits of tx options, Instructions for management, Patient and family education, Importance of tx compliance, Risk factor reductions, Impressions, Counseling / Coordination of care, Documenting in the medical record, Reviewing / ordering tests, medicine, procedures  , Obtaining or reviewing history  , and Communicating with other healthcare professionals .

## 2024-12-05 ENCOUNTER — TELEPHONE (OUTPATIENT)
Dept: FAMILY MEDICINE CLINIC | Facility: CLINIC | Age: 19
End: 2024-12-05

## 2024-12-05 ENCOUNTER — TRANSITIONAL CARE MANAGEMENT (OUTPATIENT)
Dept: FAMILY MEDICINE CLINIC | Facility: CLINIC | Age: 19
End: 2024-12-05

## 2024-12-05 NOTE — TELEPHONE ENCOUNTER
Patient called back to schedul TCM only wants to see you. You don't have anything for 30 minutes. Are you able to squeeze her in anywhere? Last day for TCM is 12/17/24. Please advise.

## 2024-12-06 ENCOUNTER — TELEPHONE (OUTPATIENT)
Age: 19
End: 2024-12-06

## 2024-12-06 NOTE — TELEPHONE ENCOUNTER
Patient called in stating yesterday she had taken excedrin and then later took sumatriptan. Patient stated that she had a panic attack after taking and doesn't want to take it again. Patient asked if it would be safe for her to take ubrelvy as she still has the headache.    Please advise, thank you

## 2024-12-06 NOTE — TELEPHONE ENCOUNTER
Left detailed message in regards to medication - advised her to call office if she has further questions or concerns.

## 2024-12-06 NOTE — TELEPHONE ENCOUNTER
Scheduled pt for Monday 12/9 however she asked if she can be seen today. Can you see her today at all? Please advise

## 2024-12-09 ENCOUNTER — OFFICE VISIT (OUTPATIENT)
Dept: FAMILY MEDICINE CLINIC | Facility: CLINIC | Age: 19
End: 2024-12-09
Payer: COMMERCIAL

## 2024-12-09 VITALS
HEIGHT: 65 IN | HEART RATE: 85 BPM | RESPIRATION RATE: 16 BRPM | TEMPERATURE: 99.1 F | WEIGHT: 131 LBS | OXYGEN SATURATION: 97 % | SYSTOLIC BLOOD PRESSURE: 107 MMHG | BODY MASS INDEX: 21.83 KG/M2 | DIASTOLIC BLOOD PRESSURE: 67 MMHG

## 2024-12-09 DIAGNOSIS — F51.04 PSYCHOPHYSIOLOGICAL INSOMNIA: ICD-10-CM

## 2024-12-09 DIAGNOSIS — G43.719 INTRACTABLE CHRONIC MIGRAINE WITHOUT AURA AND WITHOUT STATUS MIGRAINOSUS: Primary | ICD-10-CM

## 2024-12-09 DIAGNOSIS — F41.1 GENERALIZED ANXIETY DISORDER: ICD-10-CM

## 2024-12-09 PROCEDURE — 99496 TRANSJ CARE MGMT HIGH F2F 7D: CPT | Performed by: FAMILY MEDICINE

## 2024-12-09 NOTE — PROGRESS NOTES
Transition of Care Visit  Name: Bacilio Salinas      : 2005      MRN: [id number]  Encounter Provider: Airam Rosa MD  Encounter Date: 2024   Encounter department: [facility]    Assessment & Plan  Intractable chronic migraine without aura and without status migrainosus  Recently hospitalized with intractable complex migraines.  She was previously on Ubrelvy and amitriptyline 25 mg daily.  Reviewed hospital records including blood work and MRI.  LP was considered but later deferred for possible outpatient procedure.  She has appointment with neurology in January.  No longer experiencing visual disturbances.  Has had mild headaches since discharge.  Reports increased somnolence and intolerance to sumatriptan 100 mg as needed and Elavil 50 mg nightly.  Patient felt Ubrelvy and 25 mg of Elavil helped manage her headaches and would prefer to resume this regimen.   Orders:    amitriptyline (ELAVIL) 25 mg tablet; Take 1 tablet (25 mg total) by mouth daily at bedtime    Psychophysiological insomnia  Resume Elavil 25 mg nightly  Orders:    amitriptyline (ELAVIL) 25 mg tablet; Take 1 tablet (25 mg total) by mouth daily at bedtime    Generalized anxiety disorder  Lower Elavil to PTA dose of 25 mg  Orders:    amitriptyline (ELAVIL) 25 mg tablet; Take 1 tablet (25 mg total) by mouth daily at bedtime         History of Present Illness     Transitional Care Management Review:   Bacilio Salinas is a 19 y.o. adult here for TCM follow up.     During the TCM phone call patient stated:  TCM Call       Date and time call was made  2024 11:00 AM    Patient was hospitialized at  [facility]    Date of Admission  24    Date of discharge  24    Diagnosis  Headache    Disposition  Home    Were the patients medications reviewed and updated  No          TCM Call       Should patient be enrolled in anticoag monitoring?  No    Scheduled for follow up?  Yes    Did you obtain your  "prescribed medications  Yes    Do you need help managing your prescriptions or medications  No    Is transportation to your appointment needed  No    I have advised the patient to call PCP with any new or worsening symptoms  Carolyn Ken,     Living Arrangements  Family members    Are you recieving any outpatient services  No    Are you recieving home care services  No    Are you using any community resources  No    Current waiver services  No    Have you fallen in the last 12 months  No    Interperter language line needed  No          Here for hospital follow up   \"Presented to the hospital on 12/3/2024 due to worsened headache and multiple episodes of transient visual changes described as blurriness/shadows/darkness without blindness to left eye only, these episodes only having occurred over the past few days to week, but in the setting of longstanding anxiety and migraines since young childhood.  Concern existed for IIH therefore patient was admitted to the hospital for further workup including MRI of the brain and the possibility of an LP depending on imaging results.  Patient was given migraine cocktail including magnesium, Toradol, Reglan, sumatriptan, and Tylenol, plus home medication of amitriptyline was increased from 25 to 40-50 mg, which patient was on for anxiety and migraine history.  MRI thankfully returned normal without any concerning signs for IIH.  It was decided that LP could be obtained in the outpatient setting if patient symptoms return or worsen and at the discretion of outpatient headache specialist, patient has appointment scheduled in January.  At this time patient declined interest in LP while inpatient, especially after all symptoms had resolved following the migraine cocktail.  Patient is medically stable for discharge and no longer requiring hospital level of care, but should follow-up as mentioned above in 1 month with headache specialist Dr. Andino, continue on increased dose of " "amitriptyline 50 Mg at bedtime, trial sumatriptan 100 mg as a headache abortive in place of prior abortive Ubrelvy although may return to Ubrelvy after few weeks trial if it is determined that sumatriptan is not effective in controlling headache severity.\"      Presently, denies visual disturbance. States she felt drowsy and tired with sumatriptan 100 mg and 50 mg of elavil.  She self discontinued sumatriptan and would like to resume ubrelvy and lower amitriptyline to 25 mg as it worked well for her in the past. Had an appt with neurology this month but was r/s for Jan. Continues to have mild headaches. Inquiring about massages that may help some of the her neck tension as she feels it may be contributing     Review of Systems   Constitutional:  Positive for fatigue (on high doses of sumitriptan and elevil).   HENT:  Negative for ear pain.    Eyes:  Positive for visual disturbance (resolved). Negative for photophobia.   Gastrointestinal:  Negative for nausea and vomiting.   Neurological:  Positive for headaches. Negative for speech difficulty.     Objective   /67 (BP Location: Left arm, Patient Position: Sitting, Cuff Size: Adult)   Pulse 85   Temp 99.1 °F (37.3 °C) (Tympanic)   Resp 16   Ht 5' 5\" (1.651 m)   Wt 59.4 kg (131 lb)   LMP 10/27/2024 (Exact Date)   SpO2 97%   BMI 21.80 kg/m²     Physical Exam  Vitals reviewed.   Constitutional:       General: She is not in acute distress.     Appearance: Normal appearance. She is not ill-appearing or toxic-appearing.   HENT:      Head: Normocephalic and atraumatic.   Eyes:      General:         Right eye: No discharge.         Left eye: No discharge.      Extraocular Movements: Extraocular movements intact.      Pupils: Pupils are equal, round, and reactive to light.   Cardiovascular:      Rate and Rhythm: Normal rate and regular rhythm.      Heart sounds: No murmur heard.  Musculoskeletal:      Cervical back: No rigidity or tenderness.   Lymphadenopathy: "      Cervical: No cervical adenopathy.   Neurological:      General: No focal deficit present.      Mental Status: She is alert and oriented to person, place, and time.      Sensory: No sensory deficit.      Coordination: Coordination normal.      Gait: Gait normal.   Psychiatric:         Mood and Affect: Mood normal.         Behavior: Behavior normal.         Thought Content: Thought content normal.       Medications have been reviewed by provider in current encounter

## 2024-12-09 NOTE — ASSESSMENT & PLAN NOTE
Recently hospitalized with intractable complex migraines.  She was previously on Ubrelvy and amitriptyline 25 mg daily.  Reviewed hospital records including blood work and MRI.  LP was considered but later deferred for possible outpatient procedure.  She has appointment with neurology in January.  No longer experiencing visual disturbances.  Has had mild headaches since discharge.  Reports increased somnolence and intolerance to sumatriptan 100 mg as needed and Elavil 50 mg nightly.  Patient felt Ubrelvy and 25 mg of Elavil helped manage her headaches and would prefer to resume this regimen.   Orders:    amitriptyline (ELAVIL) 25 mg tablet; Take 1 tablet (25 mg total) by mouth daily at bedtime

## 2024-12-11 NOTE — ASSESSMENT & PLAN NOTE
Lower Elavil to PTA dose of 25 mg  Orders:    amitriptyline (ELAVIL) 25 mg tablet; Take 1 tablet (25 mg total) by mouth daily at bedtime

## 2024-12-16 ENCOUNTER — PATIENT MESSAGE (OUTPATIENT)
Dept: FAMILY MEDICINE CLINIC | Facility: CLINIC | Age: 19
End: 2024-12-16

## 2024-12-16 DIAGNOSIS — G43.719 INTRACTABLE CHRONIC MIGRAINE WITHOUT AURA AND WITHOUT STATUS MIGRAINOSUS: Primary | ICD-10-CM

## 2025-01-02 ENCOUNTER — PATIENT MESSAGE (OUTPATIENT)
Dept: FAMILY MEDICINE CLINIC | Facility: CLINIC | Age: 20
End: 2025-01-02

## 2025-01-02 DIAGNOSIS — G44.89 OTHER HEADACHE SYNDROME: Primary | ICD-10-CM

## 2025-01-02 RX ORDER — KETOROLAC TROMETHAMINE 10 MG/1
10 TABLET, FILM COATED ORAL EVERY 6 HOURS PRN
Qty: 12 TABLET | Refills: 0 | Status: SHIPPED | OUTPATIENT
Start: 2025-01-02 | End: 2025-01-05

## 2025-01-07 ENCOUNTER — TELEPHONE (OUTPATIENT)
Dept: NEUROLOGY | Facility: CLINIC | Age: 20
End: 2025-01-07

## 2025-01-07 ENCOUNTER — OFFICE VISIT (OUTPATIENT)
Dept: FAMILY MEDICINE CLINIC | Facility: CLINIC | Age: 20
End: 2025-01-07
Payer: COMMERCIAL

## 2025-01-07 DIAGNOSIS — M99.01 SOMATIC DYSFUNCTION OF CERVICAL REGION: ICD-10-CM

## 2025-01-07 DIAGNOSIS — M99.02 SOMATIC DYSFUNCTION OF THORACIC REGION: ICD-10-CM

## 2025-01-07 DIAGNOSIS — M99.00 SOMATIC DYSFUNCTION OF HEAD REGION: ICD-10-CM

## 2025-01-07 DIAGNOSIS — M99.07 SOMATIC DYSFUNCTION OF UPPER EXTREMITY: ICD-10-CM

## 2025-01-07 DIAGNOSIS — M99.08 SOMATIC DYSFUNCTION OF RIB CAGE REGION: ICD-10-CM

## 2025-01-07 DIAGNOSIS — G44.89 OTHER HEADACHE SYNDROME: Primary | ICD-10-CM

## 2025-01-07 PROCEDURE — 98927 OSTEOPATH MANJ 5-6 REGIONS: CPT | Performed by: FAMILY MEDICINE

## 2025-01-07 PROCEDURE — 99213 OFFICE O/P EST LOW 20 MIN: CPT | Performed by: FAMILY MEDICINE

## 2025-01-07 NOTE — LETTER
January 7, 2025     Patient: Bacilio Salinas  YOB: 2005  Date of Visit: 1/7/2025      To Whom it May Concern:    Bacilio Salinas is under my professional care. Bacilio was seen in my office on 1/7/2025. Bacilio may return to school on 1/7/2025 .    If you have any questions or concerns, please don't hesitate to call.         Sincerely,          Benoit Esqueda,         CC: No Recipients

## 2025-01-07 NOTE — PROGRESS NOTES
The Assessment & Plan     This is a 19 y.o. female who presents for OMT follow-up for:  1. Other headache syndrome  OMT      2. Somatic dysfunction of head region  OMT      3. Somatic dysfunction of cervical region  OMT      4. Somatic dysfunction of thoracic region  OMT      5. Somatic dysfunction of upper extremity  OMT      6. Somatic dysfunction of rib cage region  OMT           1. Patient tolerated OMT well for the above problems,  advised patient to drink fluids and can use NSAID for soreness after treatment     2. OMT Follow up in 2 weeks.    Dionna Salinas is a 19 y.o. female and is here for a OMT follow up. The patient reports with headaches. She was referred fby Dr Rosa. 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 Notes often headache start on the R side from her neck. And can radiate up.     Is the patient taking Pain medication? yes  Has the patient completed physical therapy for this condition? no  Did Patient symptoms improve from last OMT appointment?  First appointment    The following portions of the patient's history were reviewed and updated as appropriate: allergies, current medications, past family history, past medical history, past social history, past surgical history, and problem list.    Review of Systems  Review of Systems   Constitutional:  Negative for chills and fever.   HENT:  Negative for ear pain and sore throat.    Eyes:  Negative for pain and visual disturbance.   Respiratory:  Negative for cough and shortness of breath.    Cardiovascular:  Negative for chest pain and palpitations.   Gastrointestinal:  Negative for abdominal pain and vomiting.   Genitourinary:  Negative for dysuria and hematuria.   Musculoskeletal:  Positive for neck pain. Negative for arthralgias and back pain.   Skin:  Negative for color change and rash.   Neurological:  Positive for headaches. Negative for seizures and syncope.   Psychiatric/Behavioral:   Negative for confusion and sleep disturbance. The patient is not nervous/anxious.    All other systems reviewed and are negative.        Objective     OMT Exam     OMT    Performed by: Benoit Esqueda DO  Authorized by: Benoit Esqueda DO  Universal Protocol:  procedure performed by consultantConsent: Verbal consent obtained.  Consent given by: patient  Patient identity confirmed: verbally with patient      Procedure Details:     Region evaluated and treated:  Head, Cervical, Thoracic, Ribs and Right Extremities    Extremity Information  Extremities: right upper extremity    Thoracic Information  Thoracic Region: T1 - T4  Head Details:     Examination Method:  Tissue Texture Change, Stability, Laxity, Effusions, Tone, Range of Motion, Contracture, Asymmetry, Misalignment, Crepitation, Defects, Masses and Tenderness, Pain    Severity:  Moderate    Osteopathic Findings:  - OA FSRRL  - R inion tender point    Treatment Method:  Counterstrain Treatment, Muscle Energy Treatment, Myofascial Release Treatment and Soft Tissue Treatment    Response:  Improved - The somatic dysfunction is improved but not completely resolved.    Cervical Details:     Examination Method:  Tissue Texture Change, Stability, Laxity, Effusions, Tone, Range of Motion, Contracture, Asymmetry, Misalignment, Crepitation, Defects, Masses and Tenderness, Pain    Severity:  Moderate    Osteopathic Findings:  - Bilateral paracervical muscle hypertonicity, right greater than left  -Right scalene hypertonicity  -Right SCM hypertonicity  -C3 flexed side bent right rotated right with tender point on the right    Treatment Method:  Counterstrain Treatment, Muscle Energy Treatment, Myofascial Release Treatment and Soft Tissue Treatment    Response:  Improved - The somatic dysfunction is improved but not completely resolved.    Thoracic T1 - T4 details:     Examination Method:  Tissue Texture Change, Stability, Laxity, Effusions, Tone, Range of Motion,  Contracture, Asymmetry, Misalignment, Crepitation, Defects, Masses and Tenderness, Pain    Severity:  Moderate    Osteopathic Findings:  -Bilateral paraspinal muscle hypertonicity, right greater than left  -Bilateral trapezius muscle hypertonicity, right greater than left  -T4 flexed side bent right rotated right tender point on the right    Treatment Method:  Counterstrain Treatment, Muscle Energy Treatment, Myofascial Release Treatment and Soft Tissue Treatment    Response:  Improved    Right Upper Extremity details:     Examination Method:  Tissue Texture Change, Stability, Laxity, Effusions, Tone, Range of Motion, Contracture, Asymmetry, Misalignment, Crepitation, Defects, Masses and Tenderness, Pain    Severity:  Moderate    Osteopathic Findings:  - Deltoid hypertonicity, decreased internal rotation    Treatment Method:  Articulatory Treatment, Muscle Energy Treatment and Soft Tissue Treatment    Response:  Resolved - The somatic dysfunction is completely resolved without evidence of it ever having been present.    Ribs details:     Examination Method:  Tissue Texture Change, Stability, Laxity, Effusions, Tone, Range of Motion, Contracture, Asymmetry, Misalignment, Crepitation, Defects, Masses and Tenderness, Pain    Severity:  Moderate    Osteopathic Findings:  Bilateral first rib elevations with tender points noted    Treatment Method:  Articulatory Treatment, Muscle Energy Treatment and Soft Tissue Treatment    Response:  Improved - The somatic dysfunction is improved but not completely resolved.    Total Regions Treated:  5

## 2025-01-07 NOTE — TELEPHONE ENCOUNTER
Spoke to pt and confirmed their  8a  appt on  1/16   w/ Sina  . Reminded pt to arrive 15 mins prior to appt to check in with .

## 2025-01-10 DIAGNOSIS — G43.719 INTRACTABLE CHRONIC MIGRAINE WITHOUT AURA AND WITHOUT STATUS MIGRAINOSUS: ICD-10-CM

## 2025-01-10 RX ORDER — UBROGEPANT 50 MG/1
TABLET ORAL
Qty: 10 TABLET | Refills: 1 | Status: SHIPPED | OUTPATIENT
Start: 2025-01-10 | End: 2025-01-16

## 2025-01-10 NOTE — TELEPHONE ENCOUNTER
JOSE R @[phone] informing pt that it is OK to start the medication even though she took aspirin. As per providers instruction.

## 2025-01-10 NOTE — TELEPHONE ENCOUNTER
Patient called to request a refill for their Ubrogepant (UBRELVY) 50 MG tablet  advised a refill was requested on 01-10-25 and is pending approval. Patient verbalized understanding and is in agreement.       Patient is completely out of medication at this time.  She would also like to know if she took Asprin this morning, how long she would need to wait to take this medication.    Please review and call patient back at

## 2025-01-13 ENCOUNTER — OFFICE VISIT (OUTPATIENT)
Dept: FAMILY MEDICINE CLINIC | Facility: CLINIC | Age: 20
End: 2025-01-13
Payer: COMMERCIAL

## 2025-01-13 VITALS
BODY MASS INDEX: 20.76 KG/M2 | WEIGHT: 124.6 LBS | SYSTOLIC BLOOD PRESSURE: 110 MMHG | OXYGEN SATURATION: 98 % | DIASTOLIC BLOOD PRESSURE: 76 MMHG | HEIGHT: 65 IN | TEMPERATURE: 98.9 F | HEART RATE: 102 BPM | RESPIRATION RATE: 16 BRPM

## 2025-01-13 DIAGNOSIS — F41.1 GENERALIZED ANXIETY DISORDER: Primary | ICD-10-CM

## 2025-01-13 DIAGNOSIS — F41.0 PANIC ATTACK: ICD-10-CM

## 2025-01-13 DIAGNOSIS — F33.2 SEVERE EPISODE OF RECURRENT MAJOR DEPRESSIVE DISORDER, WITHOUT PSYCHOTIC FEATURES (HCC): ICD-10-CM

## 2025-01-13 PROCEDURE — 99213 OFFICE O/P EST LOW 20 MIN: CPT | Performed by: FAMILY MEDICINE

## 2025-01-13 RX ORDER — LORAZEPAM 0.5 MG/1
0.25 TABLET ORAL 2 TIMES DAILY
Qty: 5 TABLET | Refills: 0 | Status: SHIPPED | OUTPATIENT
Start: 2025-01-13 | End: 2025-01-24 | Stop reason: SDUPTHER

## 2025-01-13 NOTE — PROGRESS NOTES
"Name: Bacilio Salinas      : 2005      MRN: [id number]  Encounter Provider: Airam Rosa MD  Encounter Date: 2025   Encounter department: [facility]  :  Assessment & Plan  Generalized anxiety disorder  Uncontrolled anxiety symptoms. Suspect panic attacks vs acute mountain syndrome. Will provide a script for ativan to take tonight and before her exam tomorrow. If symptoms do not improve, consider acetazolamide 250 mg every 8-12 hours.     Orders:    LORazepam (Ativan) 0.5 mg tablet; Take 0.5 tablets (0.25 mg total) by mouth 2 (two) times a day    Panic attack    Orders:    LORazepam (Ativan) 0.5 mg tablet; Take 0.5 tablets (0.25 mg total) by mouth 2 (two) times a day    Severe episode of recurrent major depressive disorder, without psychotic features (HCC)                  History of Present Illness     Here to discuss anxiety   Started having panic attacks yesterday  [place] the [place]   While ascending, she felt sob, nausea, headache, chest pain, lightheaded, anxious, and palpitations  She descended on the bus and hasn't been able to calm down since   Today, she had an exams she couldn't complete.   Had to complete the test in a separate room   Requesting a latter to allow her to do her exam tomorrow in the library     Panic Attack  Associated symptoms include chest pain, headaches and nausea.   Dizziness  Associated symptoms include chest pain, headaches and nausea.   Review of Systems   Respiratory:  Positive for shortness of breath.    Cardiovascular:  Positive for chest pain and palpitations.   Gastrointestinal:  Positive for nausea.   Neurological:  Positive for dizziness and headaches.   Psychiatric/Behavioral:  The patient is nervous/anxious.        Objective   /76 (BP Location: Left arm, Patient Position: Sitting, Cuff Size: Adult)   Pulse 102   Temp 98.9 °F (37.2 °C) (Tympanic)   Resp 16   Ht 5' 5\" (1.651 m)   Wt 56.5 kg (124 lb 9.6 oz)   SpO2 " 98%   BMI 20.73 kg/m²      Physical Exam  Vitals reviewed.   Constitutional:       General: She is not in acute distress.     Appearance: She is not ill-appearing or toxic-appearing.   HENT:      Head: Normocephalic and atraumatic.   Cardiovascular:      Rate and Rhythm: Tachycardia present.      Heart sounds: No murmur heard.  Pulmonary:      Effort: Pulmonary effort is normal. No respiratory distress.      Breath sounds: No stridor. No wheezing, rhonchi or rales.   Neurological:      Mental Status: She is alert.   Psychiatric:         Mood and Affect: Mood is anxious.     Administrative Statements   I have spent a total time of 30 minutes in caring for this patient on the day of the visit/encounter including Risks and benefits of tx options, Instructions for management, Patient and family education, Importance of tx compliance, Risk factor reductions, Impressions, Counseling / Coordination of care, and Obtaining or reviewing history  .

## 2025-01-14 NOTE — ASSESSMENT & PLAN NOTE
Uncontrolled anxiety symptoms. Suspect panic attacks vs acute mountain syndrome. Will provide a script for ativan to take tonight and before her exam tomorrow. If symptoms do not improve, consider acetazolamide 250 mg every 8-12 hours.     Orders:    LORazepam (Ativan) 0.5 mg tablet; Take 0.5 tablets (0.25 mg total) by mouth 2 (two) times a day

## 2025-01-16 ENCOUNTER — OFFICE VISIT (OUTPATIENT)
Dept: FAMILY MEDICINE CLINIC | Facility: CLINIC | Age: 20
End: 2025-01-16
Payer: COMMERCIAL

## 2025-01-16 ENCOUNTER — OFFICE VISIT (OUTPATIENT)
Dept: NEUROLOGY | Facility: CLINIC | Age: 20
End: 2025-01-16
Payer: COMMERCIAL

## 2025-01-16 ENCOUNTER — APPOINTMENT (OUTPATIENT)
Dept: LAB | Facility: CLINIC | Age: 20
End: 2025-01-16
Payer: COMMERCIAL

## 2025-01-16 VITALS
OXYGEN SATURATION: 99 % | SYSTOLIC BLOOD PRESSURE: 124 MMHG | HEART RATE: 89 BPM | DIASTOLIC BLOOD PRESSURE: 62 MMHG | HEIGHT: 65 IN | BODY MASS INDEX: 21.13 KG/M2 | WEIGHT: 126.8 LBS | TEMPERATURE: 98.2 F

## 2025-01-16 VITALS
OXYGEN SATURATION: 98 % | WEIGHT: 123.8 LBS | RESPIRATION RATE: 16 BRPM | SYSTOLIC BLOOD PRESSURE: 110 MMHG | HEIGHT: 65 IN | TEMPERATURE: 96.8 F | DIASTOLIC BLOOD PRESSURE: 62 MMHG | BODY MASS INDEX: 20.62 KG/M2 | HEART RATE: 83 BPM

## 2025-01-16 DIAGNOSIS — N76.0 ACUTE VAGINITIS: Primary | ICD-10-CM

## 2025-01-16 DIAGNOSIS — F41.9 ANXIETY AND DEPRESSION: ICD-10-CM

## 2025-01-16 DIAGNOSIS — G43.E09 CHRONIC MIGRAINE WITH AURA WITHOUT STATUS MIGRAINOSUS, NOT INTRACTABLE: ICD-10-CM

## 2025-01-16 DIAGNOSIS — R30.0 DYSURIA: ICD-10-CM

## 2025-01-16 DIAGNOSIS — R42 LIGHTHEADEDNESS: ICD-10-CM

## 2025-01-16 DIAGNOSIS — F41.1 GENERALIZED ANXIETY DISORDER: ICD-10-CM

## 2025-01-16 DIAGNOSIS — F32.A ANXIETY AND DEPRESSION: ICD-10-CM

## 2025-01-16 DIAGNOSIS — G43.E09 CHRONIC MIGRAINE WITH AURA WITHOUT STATUS MIGRAINOSUS, NOT INTRACTABLE: Primary | ICD-10-CM

## 2025-01-16 DIAGNOSIS — T70.29XA ACUTE MOUNTAIN SICKNESS: ICD-10-CM

## 2025-01-16 DIAGNOSIS — M54.2 CERVICALGIA: ICD-10-CM

## 2025-01-16 DIAGNOSIS — D75.1 ACUTE MOUNTAIN SICKNESS: ICD-10-CM

## 2025-01-16 DIAGNOSIS — G47.00 INSOMNIA: ICD-10-CM

## 2025-01-16 LAB
SL AMB  POCT GLUCOSE, UA: NORMAL
SL AMB LEUKOCYTE ESTERASE,UA: NORMAL
SL AMB POCT BILIRUBIN,UA: NORMAL
SL AMB POCT BLOOD,UA: NORMAL
SL AMB POCT CLARITY,UA: CLEAR
SL AMB POCT COLOR,UA: YELLOW
SL AMB POCT KETONES,UA: NORMAL
SL AMB POCT NITRITE,UA: NORMAL
SL AMB POCT PH,UA: 6.5
SL AMB POCT SPECIFIC GRAVITY,UA: 1
SL AMB POCT URINE PROTEIN: NORMAL
SL AMB POCT UROBILINOGEN: NORMAL

## 2025-01-16 PROCEDURE — 87480 CANDIDA DNA DIR PROBE: CPT | Performed by: FAMILY MEDICINE

## 2025-01-16 PROCEDURE — 99214 OFFICE O/P EST MOD 30 MIN: CPT | Performed by: FAMILY MEDICINE

## 2025-01-16 PROCEDURE — G2211 COMPLEX E/M VISIT ADD ON: HCPCS | Performed by: STUDENT IN AN ORGANIZED HEALTH CARE EDUCATION/TRAINING PROGRAM

## 2025-01-16 PROCEDURE — 99205 OFFICE O/P NEW HI 60 MIN: CPT | Performed by: STUDENT IN AN ORGANIZED HEALTH CARE EDUCATION/TRAINING PROGRAM

## 2025-01-16 PROCEDURE — 81002 URINALYSIS NONAUTO W/O SCOPE: CPT | Performed by: FAMILY MEDICINE

## 2025-01-16 PROCEDURE — 87510 GARDNER VAG DNA DIR PROBE: CPT | Performed by: FAMILY MEDICINE

## 2025-01-16 PROCEDURE — 87660 TRICHOMONAS VAGIN DIR PROBE: CPT | Performed by: FAMILY MEDICINE

## 2025-01-16 RX ORDER — PROPRANOLOL HYDROCHLORIDE 60 MG/1
60 CAPSULE, EXTENDED RELEASE ORAL
Qty: 30 CAPSULE | Refills: 6 | Status: SHIPPED | OUTPATIENT
Start: 2025-01-16

## 2025-01-16 RX ORDER — ACETAZOLAMIDE 125 MG/1
250 TABLET ORAL 2 TIMES DAILY
Qty: 8 TABLET | Refills: 0 | Status: SHIPPED | OUTPATIENT
Start: 2025-01-16 | End: 2025-01-18

## 2025-01-16 RX ORDER — ESCITALOPRAM OXALATE 5 MG/1
5 TABLET ORAL DAILY
Qty: 30 TABLET | Refills: 1 | Status: SHIPPED | OUTPATIENT
Start: 2025-01-16

## 2025-01-16 NOTE — PROGRESS NOTES
Review of Systems   Constitutional:  Negative for appetite change, fatigue and fever.   HENT: Negative.  Negative for hearing loss, tinnitus, trouble swallowing and voice change.    Eyes:  Positive for visual disturbance (loss of vision in left eye w/ severe migraine). Negative for photophobia and pain.   Respiratory: Negative.  Negative for shortness of breath.    Cardiovascular: Negative.  Negative for palpitations.   Gastrointestinal:  Positive for nausea (w/ migraine). Negative for vomiting.   Endocrine: Negative.  Negative for cold intolerance.   Genitourinary: Negative.  Negative for dysuria, frequency and urgency.   Musculoskeletal:  Negative for back pain, gait problem, myalgias, neck pain and neck stiffness.   Skin: Negative.  Negative for rash.   Allergic/Immunologic: Negative.    Neurological:  Positive for dizziness and headaches (10-15/30 HA - 3 are more severe - 3/7 weekly Advil). Negative for tremors, seizures, syncope, facial asymmetry, speech difficulty, weakness, light-headedness and numbness.   Hematological: Negative.  Does not bruise/bleed easily.   Psychiatric/Behavioral: Negative.  Negative for confusion, hallucinations and sleep disturbance.    All other systems reviewed and are negative.

## 2025-01-16 NOTE — ASSESSMENT & PLAN NOTE
Sativa is here today for evaluation of suspected migraines that have been a longstanding issue for her.  She has been treated by her PCP today without any success.  She is currently taking amitriptyline for a combination of things including headaches, mood, and sleep, but does not find it to be effective.  She is scheduled to see her PCP later today so I have encouraged her to discuss this medication with them and consider tapering her off.  From a headache prevention standpoint, she was open to trying propranolol.  She had previously tried this for short period of time at a subtherapeutic dose so she will update me approximately 4 weeks after starting this medication.  If this is ineffective despite adjusting the dose, we can consider Botox if approved by her insurance given the muscular component present in her neck.  From an abortive standpoint, Ubrelvy tends to work for the most part without causing any side effects.  I recommended that we increase the dose to 100 mg and see if that is more therapeutic for her.  She has multiple contributing factors to her headaches including insomnia and uncontrolled anxiety.  I would like her to see our sleep medicine team for an evaluation and continue to work with her PCP on treating her anxiety.  Finally, I have encouraged her to schedule a follow-up with her eye doctor for a dilated eye exam.

## 2025-01-16 NOTE — PATIENT INSTRUCTIONS
Referrals:  Sleep medicine    Headache Calendar  Please maintain a headache calendar  Consider using phone applications such as Migraine Aryan or [place] Migraine Tracker    Headache/migraine treatment:   Acute medications (for immediate treatment of a headache):   It is ok to take ibuprofen, acetaminophen or naproxen (Advil, Tylenol,  Aleve, Excedrin) if they help your headaches you should limit these to No more than 2-3 times a week to avoid medication overuse/rebound headaches.     For your more moderate to severe migraines take this medication early  Ubrelvy 100mg tabs - take one at the onset of headache. May repeat one time after 2 hours if pain has not resolved.   (Max 2 a day)    Prescription preventive medications for headaches/migraines   (to take every day to help prevent headaches - not to take at the time of headache):  Propranolol- 60mg at bedtime    *Typically these types of medications take time until you see the benefit, although some may see improvement in days, often it may take weeks, especially if the medication is being titrated up to a beneficial level. Please contact us if there are any concerns or questions regarding the medication.     Lifestyle Recommendations:  [x] SLEEP - Maintain a regular sleep schedule: Adults need at least 7-8 hours of uninterrupted a night. Maintain good sleep hygiene:  Going to bed and waking up at consistent times, avoiding excessive daytime naps, avoiding caffeinated beverages in the evening, avoid excessive stimulation in the evening and generally using bed primarily for sleeping.  One hour before bedtime would recommend turning lights down lower, decreasing your activity (may read quietly, listen to music at a low volume). When you get into bed, should eliminate all technology (no texting, emailing, playing with your phone, iPad or tablet in bed).  [x] HYDRATION - Maintain good hydration.  Drink  2L of fluid a day (4 typical small water bottles)  [x] DIET -  Maintain good nutrition. In particular don't skip meals and try and eat healthy balanced meals regularly.  [x] TRIGGERS - Look for other triggers and avoid them: Limit caffeine to 1-2 cups a day or less. Avoid dietary triggers that you have noticed bring on your headaches (this could include aged cheese, peanuts, MSG, aspartame and nitrates).  [x] EXERCISE - physical exercise as we all know is good for you in many ways, and not only is good for your heart, but also is beneficial for your mental health, cognitive health and  chronic pain/headaches. I would encourage at the least 5 days of physical exercise weekly for at least 30 minutes.     Education and Follow-up  [x] Please call with any questions or concerns. Of course if any new concerning symptoms go to the emergency department.  [x] Follow up in 3 months

## 2025-01-16 NOTE — PROGRESS NOTES
"Name: Bacilio Salinas      : 2005      MRN: [id number]  Encounter Provider: Airam Rosa MD  Encounter Date: 2025   Encounter department: [facility]  :  Assessment & Plan  Acute vaginitis  Irritation around the labia minora. Vaginosis DNA collected. Urine dip was normal. May start OTC monistat 3. Follow up results  Orders:    Vaginosis DNA Probe    POCT urine dip    Chronic migraine with aura without status migrainosus, not intractable  Saw neurology and order propanolol 60 mg. Did inform patient that it may lower BP and HR and to call if lightheadedness were to get worse. Continue ubrelvy prn   Orders:    Iron Panel (Includes Ferritin, Iron Sat%, Iron, and TIBC); Future    CBC and differential; Future    Vitamin B12; Future    Dysuria  Urine dip negative. Likely irritation from yeast or BV   Orders:    POCT urine dip    Generalized anxiety disorder  More anxious on Elavil but has recently improved with adding a few doses of Ativan for panic attacks. Those have resolved and feel lexapro worked better for her. Switch back to lexapro 5 mg   Orders:    escitalopram (LEXAPRO) 5 mg tablet; Take 1 tablet (5 mg total) by mouth daily    Vitamin B12; Future    Acute mountain sickness  Rest of her symptoms have slowly dissipated but still lightheaded. Will check labs and start acetazolamide 125 mg BID for 1 day. May take up to 250 mg per dose for 1 day  Orders:    acetaZOLAMIDE (DIAMOX) 125 mg tablet; Take 2 tablets (250 mg total) by mouth 2 (two) times a day for 2 days           History of Present Illness     Still feeling lightheaded   Described it as if she is\" floating like a balloon\" or \" on a roller coaster\"  Less anxious and no longer experiencing panic attacks  Would like to switch from Elavil back to Lexapro 5 mg   Elavil makes her tired and doesn't feel its is working anymore   Also reports itching, burning within the vagina, pain with intercourse,  and left lower quandrant pain " "worse with intercourse)  Also noted urinary frequency and incomplete voids      Review of Systems    Objective   /62 (BP Location: Left arm, Patient Position: Sitting, Cuff Size: Standard)   Pulse 83   Temp (!) 96.8 °F (36 °C) (Tympanic)   Resp 16   Ht 5' 5\" (1.651 m)   Wt 56.2 kg (123 lb 12.8 oz)   SpO2 98%   BMI 20.60 kg/m²      Physical Exam  Vitals reviewed.   Constitutional:       General: She is not in acute distress.     Appearance: Normal appearance. She is not ill-appearing or toxic-appearing.   HENT:      Head: Normocephalic and atraumatic.   Eyes:      Extraocular Movements: Extraocular movements intact.   Abdominal:      Palpations: Abdomen is soft.      Tenderness: There is abdominal tenderness in the suprapubic area and left lower quadrant.      Hernia: No hernia is present.   Genitourinary:     Pubic Area: No rash.       Labia:         Right: Rash and tenderness present. No lesion or injury.         Left: Rash and tenderness present. No lesion or injury.       Urethra: No prolapse, urethral pain, urethral swelling or urethral lesion.   Skin:     General: Skin is warm.   Neurological:      Mental Status: She is alert and oriented to person, place, and time.   Psychiatric:         Mood and Affect: Mood normal.         Behavior: Behavior normal.         Thought Content: Thought content normal.       Administrative Statements   I have spent a total time of 40 minutes in caring for this patient on the day of the visit/encounter including Risks and benefits of tx options, Instructions for management, Patient and family education, Importance of tx compliance, Risk factor reductions, Impressions, Counseling / Coordination of care, Documenting in the medical record, Reviewing / ordering tests, medicine, procedures  , and Obtaining or reviewing history  .  "

## 2025-01-16 NOTE — PROGRESS NOTES
Neurology Ambulatory Visit  Name: Bacilio Salinas       : 2005       MRN: [id number]   Encounter Provider: Juan Antonio Andino DO   Encounter Date: 2025  Encounter department: [facility]    Bacilio Salinas is a very pleasant 19 y.o.  right handed female with a past medical history significant for anxiety, depression, migraines, who is presenting to the Neurology office for evaluation of headaches.    Assessment and Plan  1. Chronic migraine with aura without status migrainosus, not intractable  Assessment & Plan:  Bacilio is here today for evaluation of suspected migraines that have been a longstanding issue for her.  She has been treated by her PCP today without any success.  She is currently taking amitriptyline for a combination of things including headaches, mood, and sleep, but does not find it to be effective.  She is scheduled to see her PCP later today so I have encouraged her to discuss this medication with them and consider tapering her off.  From a headache prevention standpoint, she was open to trying propranolol.  She had previously tried this for short period of time at a subtherapeutic dose so she will update me approximately 4 weeks after starting this medication.  If this is ineffective despite adjusting the dose, we can consider Botox if approved by her insurance given the muscular component present in her neck.  From an abortive standpoint, Ubrelvy tends to work for the most part without causing any side effects.  I recommended that we increase the dose to 100 mg and see if that is more therapeutic for her.  She has multiple contributing factors to her headaches including insomnia and uncontrolled anxiety.  I would like her to see our sleep medicine team for an evaluation and continue to work with her PCP on treating her anxiety.  Finally, I have encouraged her to schedule a follow-up with her eye doctor for a dilated eye exam.  Orders:  -     Ambulatory Referral  to Neurology  -     Ubrogepant (UBRELVY) 100 MG tablet; Take 1 tablet (100 mg) one time as needed for migraine. May repeat one additional tablet (100 mg) at least two hours after the first dose. Do not use more than two doses per day, or for more than eight days per month.  -     propranolol (INDERAL LA) 60 mg 24 hr capsule; Take 1 capsule (60 mg total) by mouth daily at bedtime  2. Anxiety and depression  3. Insomnia  -     Ambulatory Referral to Sleep Medicine; Future  4. Cervicalgia    Workup:  - Neurologic assessment reveals unremarkable neurological exam.  - MRI brain without contrast 12/3/2024: Normal imaging.  No white matter changes  - Sleep medicine referral    Preventative:  - we discussed headache hygiene and lifestyle factors that may improve headaches  - Propranolol 60mg HS  - Currently on through other providers: Amitriptyline  - Past/ failed/contraindicated: Propranolol (only tried for a couple weeks), Lexapro, Melatonin (nightmares)  - future options: TCA/SNRI, Topamax, Memantine, Diamox, CGRP med, botox    Acute:  - discussed not taking over-the-counter or prescription pain medications more than 3 days per week to prevent medication overuse/rebound headache  - Ubrelvy 100mg  - Currently on through other providers: None  - Past/ failed/contraindicated: Sumatriptan (panic attack), Rizatriptan (unclear response)  - future options:  Triptan, prochlorperazine, Toradol IM or p.o., could consider trial of 5 days of Depakote 500 mg nightly or dexamethasone 2 mg daily for prolonged migraine, reyvow, nurtec, zavzpret  Patient instructions   Referrals:  Sleep medicine    Headache Calendar  Please maintain a headache calendar  Consider using phone applications such as Migraine Buddy or [place] Migraine Tracker    Headache/migraine treatment:   Acute medications (for immediate treatment of a headache):   It is ok to take ibuprofen, acetaminophen or naproxen (Advil, Tylenol,  Aleve, Excedrin) if they help your  headaches you should limit these to No more than 2-3 times a week to avoid medication overuse/rebound headaches.     For your more moderate to severe migraines take this medication early  Ubrelvy 100mg tabs - take one at the onset of headache. May repeat one time after 2 hours if pain has not resolved.   (Max 2 a day)    Prescription preventive medications for headaches/migraines   (to take every day to help prevent headaches - not to take at the time of headache):  Propranolol- 60mg at bedtime    *Typically these types of medications take time until you see the benefit, although some may see improvement in days, often it may take weeks, especially if the medication is being titrated up to a beneficial level. Please contact us if there are any concerns or questions regarding the medication.     Lifestyle Recommendations:  [x] SLEEP - Maintain a regular sleep schedule: Adults need at least 7-8 hours of uninterrupted a night. Maintain good sleep hygiene:  Going to bed and waking up at consistent times, avoiding excessive daytime naps, avoiding caffeinated beverages in the evening, avoid excessive stimulation in the evening and generally using bed primarily for sleeping.  One hour before bedtime would recommend turning lights down lower, decreasing your activity (may read quietly, listen to music at a low volume). When you get into bed, should eliminate all technology (no texting, emailing, playing with your phone, iPad or tablet in bed).  [x] HYDRATION - Maintain good hydration.  Drink  2L of fluid a day (4 typical small water bottles)  [x] DIET - Maintain good nutrition. In particular don't skip meals and try and eat healthy balanced meals regularly.  [x] TRIGGERS - Look for other triggers and avoid them: Limit caffeine to 1-2 cups a day or less. Avoid dietary triggers that you have noticed bring on your headaches (this could include aged cheese, peanuts, MSG, aspartame and nitrates).  [x] EXERCISE - physical  exercise as we all know is good for you in many ways, and not only is good for your heart, but also is beneficial for your mental health, cognitive health and  chronic pain/headaches. I would encourage at the least 5 days of physical exercise weekly for at least 30 minutes.     Education and Follow-up  [x] Please call with any questions or concerns. Of course if any new concerning symptoms go to the emergency department.  [x] Follow up in 3 months  History of Present Illness:       Pertinent history:  -Patient was recently admitted to the hospital in December 2020 for due to headaches and vision changes.  No papilledema seen on her evaluation.  MRI was normal as well.  Suspected that her symptoms were in the setting of migraines    Headaches started at what age? 3-2 years old  How often do the headaches occur?   - as of 1/16/2025: 30/30 (of those, about 10-15 can be more severe)  What time of the day do the headaches start?  Morning primarily, but throughout the day as well  How long do the headaches last? 5 days  Are you ever headache free? Yes    HIT-6 (60> Severe impact on life, 56-59 substantial impact on your life, 50-55 some impact, <49 little to no impact on your life.)  When you have headaches, how often is the pain severe?  Never (6), Rarely (8), Sometimes (10), Very often (11), Always (13)  2.    How often do headaches limit your ability to do usual daily activities including household work, work, school, or social activities?  Never (6), Rarely (8), Sometimes (10), Very often (11), Always (13)  3.    When you have a headache, how often do you wish you could lie down?  Never (6), Rarely (8), Sometimes (10), Very often (11), Always (13)  4.     In the past 4 weeks, how often have you felt too tired to do work or daily activities because of your headaches?  Never (6), Rarely (8), Sometimes (10), Very often (11), Always (13)  5.     In the past 4 weeks, how often have you felt fed up or irritated because of  "your headaches?  Never (6), Rarely (8), Sometimes (10), Very often (11), Always (13)  6.     In the past 4 weeks, how often did headaches limit your ability to concentrate on work or daily activities?  Never (6), Rarely (8), Sometimes (10), Very often (11), Always (13)  Total: 67    Aura/warning? Yes  - Reports \"vision loss\" - as if she had dark sunglasses on  - Spots prior to onset at times  - Neck pain prior to onset    Last eye exam: about 6 months ago - non-dilated. Nothing concerning    Where is your headache located?   retro-orbital, temporalis, and neck    Describe your usual headache   Throbbing, Pounding, Sharp, Aching, and Dull    What is the intensity of pain?   Worst 10/10, Average: 6/10    Associated symptoms:   [x] Nausea       [x] Vomiting        [x] Diarrhea  [x] Stiff or sore neck   [x] Photophobia     [x]Phonophobia      [x] Osmophobia  [x] Blurred vision   [x] Prefer quiet, dark room  [x] Light-headed or dizzy     [x] Tinnitus - sometimes, but not associated with headache    What medications do you take or have you taken for your headaches?   ABORTIVE:    OTC medications: Advil, Excedrin migraine  Prescription: Ubrelvy (needs to take second dose sometimes)    Past/ failed/contraindicated:  OTC medications: None  Prescription: Sumatriptan (panic attack), Rizatriptan (unclear response)    PREVENTIVE:   Amitriptyline    Past/ failed/contraindicated:  Propranolol (only tried for a couple weeks), Lexapro    Alternative therapies used in the past for headaches? [] Massage   [] Physical therapy   [x] Chiropractor [] Acupuncture [] Acupressure   [x] OMT  [] Biofeedback    Headache are worse with: [] Coughing, [] Sneezing, [] Bending over, [x] Exertion    Headache triggers:  sweet tea, menstrual cycle, cheese    Have you seen someone else for headaches or pain? Yes, neurology  Have you had trigger point injection performed and how often? No  Have you had Botox injection performed and how often? No   Have " "you had epidural injections or transforaminal injections performed? No    Are you current pregnant or planning on getting pregnant? No  Are you breastfeeding or planning on breastfeeding? No    Have you ever had any Brain imaging? yes; I personally reviewed these images.  -MRI brain without contrast 12/3/2024: Normal imaging.  No white matter changes  Pertinent labs: None    Sleep History  Prior Sleep study:  No    Is your sleep restful? No  Do you wake up with headaches? Yes  How many hours do you actually sleep? About 5  Do you snore while asleep? Yes  Have you been told that you stop breathing during sleeping? No  Do you wake up tired in the morning? Yes  Do you take frequent naps during the day? Yes - \"if I can\"  Do you have jaw pain? No  Do you grind/clench your teeth at night? No - sometimes with headache    Psychiatric History:  Anxiety: Yes  Depression: Yes  Psychiatric Admissions: No  Follow with psychiatry/psychology: No    Lifestyle:  Physical activity: Not recently  Water: about 80oz per day  Caffeine: 1 can of coke about 2-3 days per week    Pertinent family history:  Family history of headaches:  migraine headaches in mother  Any family history of aneurysms - No    Pertinent social history:  Work: [employer]  Education: [employer] - 2nd term  Lives with family    Illicit Drugs: denies  Alcohol/tobacco: Denies tobacco use, alcohol intake: social drinker  Review of Systems:   Constitutional:  Negative for appetite change, fatigue and fever.   HENT: Negative.  Negative for hearing loss, tinnitus, trouble swallowing and voice change.    Eyes:  Positive for visual disturbance (loss of vision in left eye w/ severe migraine). Negative for photophobia and pain.   Respiratory: Negative.  Negative for shortness of breath.    Cardiovascular: Negative.  Negative for palpitations.   Gastrointestinal:  Positive for nausea (w/ migraine). Negative for vomiting.   Endocrine: Negative.  Negative for cold intolerance. " "  Genitourinary: Negative.  Negative for dysuria, frequency and urgency.   Musculoskeletal:  Negative for back pain, gait problem, myalgias, neck pain and neck stiffness.   Skin: Negative.  Negative for rash.   Allergic/Immunologic: Negative.    Neurological:  Positive for dizziness and headaches (10-15/30 HA - 3 are more severe - 3/7 weekly Advil). Negative for tremors, seizures, syncope, facial asymmetry, speech difficulty, weakness, light-headedness and numbness.   Hematological: Negative.  Does not bruise/bleed easily.   Psychiatric/Behavioral: Negative.  Negative for confusion, hallucinations and sleep disturbance.    All other systems reviewed and are negative.  Objective:                                                                  Vitals:            Constitutional:    /62 (BP Location: Right arm, Patient Position: Sitting, Cuff Size: Standard)   Pulse 89   Temp 98.2 °F (36.8 °C) (Temporal)   Ht 5' 5\" (1.651 m)   Wt 57.5 kg (126 lb 12.8 oz)   SpO2 99%   BMI 21.10 kg/m²   BP Readings from Last 3 Encounters:   01/16/25 124/62   01/13/25 110/76   12/09/24 107/67     Pulse Readings from Last 3 Encounters:   01/16/25 89   01/13/25 102   12/09/24 85         Well developed, well nourished, well groomed. No dysmorphic features.       HEENT:  Normocephalic atraumatic.   Oropharynx is clear and moist. No oral mucosal lesions.   Chest:  Respirations regular and unlabored.    Cardiovascular:  Distal extremities warm without palpable edema or tenderness, no observed significant swelling.    Musculoskeletal:  (see below under neurologic exam for evaluation of motor function and gait)   Skin:  warm and dry, not diaphoretic. No apparent birthmarks or stigmata of neurocutaneous disease.   Psychiatric:  Normal behavior and appropriate affect     Neurological Examination:     Mental status/cognitive function:   Orientated to time, place and person. Recent and remote memory intact. Attention span and " concentration as well as fund of knowledge are appropriate for age. Normal language and spontaneous speech.    Cranial Nerves:  II-visual fields full.   Fundi poorly visualized due to pupillary constriction  III, IV, VI-Pupils were equal, round, and reactive to light and accomodation. Extraocular movements were full and conjugate without nystagmus.  Conjugate gaze, normal smooth pursuits, normal saccades   V-facial sensation symmetric.    VII-facial expression symmetric, intact forehead wrinkle, strong eye closure, symmetric smile    VIII-hearing grossly intact bilaterally   IX, X-palate elevation symmetric, no dysarthria.   XI-shoulder shrug strength intact    XII-tongue protrusion midline.    Motor Exam: symmetric bulk and tone throughout, no pronator drift. Power/strength 5/5 bilateral upper and lower extremities, no atrophy, fasciculations or abnormal movements noted.   Sensory: grossly intact light touch in all extremities.   Reflexes: brachioradialis 2+, biceps 2+, knee 2+, ankle 2+ bilaterally. No ankle clonus  Coordination: Finger nose finger intact bilaterally, no apparent dysmetria, ataxia or tremor noted  Gait: steady casual and tandem gait.         I have spent 55 minutes with the patient today in which greater than 50% of this time was spent in counseling/coordination of care regarding Diagnostic results, Prognosis, Risks and benefits of tx options, Patient and family education, Importance of tx compliance, Impressions, Documenting in the medical record, Reviewing / ordering tests, medicine, procedures  , and Obtaining or reviewing history  . I also spent 15 minutes non face to face for this patient the same day.     Activity Minutes   Precharting/reviewing 10   Patient care/counseling 55   Postcharting/care coordination 5     Voice recognition software was used in the generation of this note. There may be unintentional errors including grammatical errors, spelling errors, or pronoun errors.

## 2025-01-16 NOTE — ASSESSMENT & PLAN NOTE
Saw neurology and order propanolol 60 mg. Did inform patient that it may lower BP and HR and to call if lightheadedness were to get worse. Continue ubrelvy prn   Orders:    Iron Panel (Includes Ferritin, Iron Sat%, Iron, and TIBC); Future    CBC and differential; Future    Vitamin B12; Future

## 2025-01-16 NOTE — LETTER
January 16, 2025     Patient: Bacilio Salinas  YOB: 2005  Date of Visit: 1/16/2025      To Whom it May Concern:    Bacilio Salinas is under my professional care. Bacilio was seen in my office on 1/16/2025. Please excuse her from work 1/14/25.     If you have any questions or concerns, please don't hesitate to call.         Sincerely,          Airam Rosa MD        CC: No Recipients

## 2025-01-16 NOTE — ASSESSMENT & PLAN NOTE
More anxious on Elavil but has recently improved with adding a few doses of Ativan for panic attacks. Those have resolved and feel lexapro worked better for her. Switch back to lexapro 5 mg   Orders:    escitalopram (LEXAPRO) 5 mg tablet; Take 1 tablet (5 mg total) by mouth daily    Vitamin B12; Future

## 2025-01-17 LAB
CANDIDA RRNA VAG QL PROBE: DETECTED
G VAGINALIS RRNA GENITAL QL PROBE: NOT DETECTED
T VAGINALIS RRNA GENITAL QL PROBE: NOT DETECTED

## 2025-01-18 ENCOUNTER — APPOINTMENT (OUTPATIENT)
Dept: LAB | Facility: CLINIC | Age: 20
End: 2025-01-18
Payer: COMMERCIAL

## 2025-01-18 DIAGNOSIS — R42 LIGHTHEADEDNESS: ICD-10-CM

## 2025-01-18 DIAGNOSIS — F41.1 GENERALIZED ANXIETY DISORDER: ICD-10-CM

## 2025-01-18 DIAGNOSIS — G43.E09 CHRONIC MIGRAINE WITH AURA WITHOUT STATUS MIGRAINOSUS, NOT INTRACTABLE: ICD-10-CM

## 2025-01-18 LAB
ALBUMIN SERPL BCG-MCNC: 4.5 G/DL (ref 3.5–5)
ALP SERPL-CCNC: 56 U/L (ref 34–104)
ALT SERPL W P-5'-P-CCNC: 7 U/L (ref 7–52)
ANION GAP SERPL CALCULATED.3IONS-SCNC: 6 MMOL/L (ref 4–13)
AST SERPL W P-5'-P-CCNC: 15 U/L (ref 13–39)
BASOPHILS # BLD AUTO: 0.07 THOUSANDS/ΜL (ref 0–0.1)
BASOPHILS NFR BLD AUTO: 1 % (ref 0–1)
BILIRUB SERPL-MCNC: 0.45 MG/DL (ref 0.2–1)
BUN SERPL-MCNC: 4 MG/DL (ref 5–25)
CALCIUM SERPL-MCNC: 9.2 MG/DL (ref 8.4–10.2)
CHLORIDE SERPL-SCNC: 105 MMOL/L (ref 96–108)
CO2 SERPL-SCNC: 28 MMOL/L (ref 21–32)
CREAT SERPL-MCNC: 0.61 MG/DL (ref 0.6–1.3)
EOSINOPHIL # BLD AUTO: 0.19 THOUSAND/ΜL (ref 0–0.61)
EOSINOPHIL NFR BLD AUTO: 4 % (ref 0–6)
ERYTHROCYTE [DISTWIDTH] IN BLOOD BY AUTOMATED COUNT: 13.1 % (ref 11.6–15.1)
FERRITIN SERPL-MCNC: 12 NG/ML (ref 11–307)
GFR SERPL CREATININE-BSD FRML MDRD: 130 ML/MIN/1.73SQ M
GLUCOSE P FAST SERPL-MCNC: 87 MG/DL (ref 65–99)
HCT VFR BLD AUTO: 39.9 % (ref 34.8–46.1)
HGB BLD-MCNC: 13.2 G/DL (ref 11.5–15.4)
IMM GRANULOCYTES # BLD AUTO: 0.01 THOUSAND/UL (ref 0–0.2)
IMM GRANULOCYTES NFR BLD AUTO: 0 % (ref 0–2)
IRON SATN MFR SERPL: 12 % (ref 15–50)
IRON SERPL-MCNC: 43 UG/DL (ref 50–212)
LYMPHOCYTES # BLD AUTO: 1.4 THOUSANDS/ΜL (ref 0.6–4.47)
LYMPHOCYTES NFR BLD AUTO: 26 % (ref 14–44)
MCH RBC QN AUTO: 28.9 PG (ref 26.8–34.3)
MCHC RBC AUTO-ENTMCNC: 33.1 G/DL (ref 31.4–37.4)
MCV RBC AUTO: 88 FL (ref 82–98)
MONOCYTES # BLD AUTO: 0.49 THOUSAND/ΜL (ref 0.17–1.22)
MONOCYTES NFR BLD AUTO: 9 % (ref 4–12)
NEUTROPHILS # BLD AUTO: 3.2 THOUSANDS/ΜL (ref 1.85–7.62)
NEUTS SEG NFR BLD AUTO: 60 % (ref 43–75)
NRBC BLD AUTO-RTO: 0 /100 WBCS
PLATELET # BLD AUTO: 315 THOUSANDS/UL (ref 149–390)
PMV BLD AUTO: 12.2 FL (ref 8.9–12.7)
POTASSIUM SERPL-SCNC: 3.8 MMOL/L (ref 3.5–5.3)
PROT SERPL-MCNC: 7.1 G/DL (ref 6.4–8.4)
RBC # BLD AUTO: 4.56 MILLION/UL (ref 3.81–5.12)
SODIUM SERPL-SCNC: 139 MMOL/L (ref 135–147)
TIBC SERPL-MCNC: 357 UG/DL (ref 250–450)
TRANSFERRIN SERPL-MCNC: 255 MG/DL (ref 203–362)
UIBC SERPL-MCNC: 314 UG/DL (ref 155–355)
VIT B12 SERPL-MCNC: 332 PG/ML (ref 180–914)
WBC # BLD AUTO: 5.36 THOUSAND/UL (ref 4.31–10.16)

## 2025-01-18 PROCEDURE — 82728 ASSAY OF FERRITIN: CPT

## 2025-01-18 PROCEDURE — 83550 IRON BINDING TEST: CPT

## 2025-01-18 PROCEDURE — 82607 VITAMIN B-12: CPT

## 2025-01-18 PROCEDURE — 83540 ASSAY OF IRON: CPT

## 2025-01-18 PROCEDURE — 80053 COMPREHEN METABOLIC PANEL: CPT

## 2025-01-18 PROCEDURE — 85025 COMPLETE CBC W/AUTO DIFF WBC: CPT

## 2025-01-18 PROCEDURE — 36415 COLL VENOUS BLD VENIPUNCTURE: CPT

## 2025-01-20 ENCOUNTER — RESULTS FOLLOW-UP (OUTPATIENT)
Dept: FAMILY MEDICINE CLINIC | Facility: CLINIC | Age: 20
End: 2025-01-20

## 2025-01-20 DIAGNOSIS — B37.31 CANDIDAL VAGINITIS: Primary | ICD-10-CM

## 2025-01-20 DIAGNOSIS — F41.0 PANIC ATTACK: ICD-10-CM

## 2025-01-20 DIAGNOSIS — F41.1 GENERALIZED ANXIETY DISORDER: ICD-10-CM

## 2025-01-20 RX ORDER — FLUCONAZOLE 150 MG/1
150 TABLET ORAL ONCE
Qty: 1 TABLET | Refills: 1 | Status: SHIPPED | OUTPATIENT
Start: 2025-01-20 | End: 2025-01-20

## 2025-01-21 ENCOUNTER — TELEPHONE (OUTPATIENT)
Dept: FAMILY MEDICINE CLINIC | Facility: CLINIC | Age: 20
End: 2025-01-21

## 2025-01-24 RX ORDER — LORAZEPAM 0.5 MG/1
0.25 TABLET ORAL DAILY
Qty: 15 TABLET | Refills: 0 | Status: SHIPPED | OUTPATIENT
Start: 2025-01-24

## 2025-01-30 ENCOUNTER — PROCEDURE VISIT (OUTPATIENT)
Dept: FAMILY MEDICINE CLINIC | Facility: CLINIC | Age: 20
End: 2025-01-30
Payer: COMMERCIAL

## 2025-01-30 DIAGNOSIS — G43.E09 CHRONIC MIGRAINE WITH AURA WITHOUT STATUS MIGRAINOSUS, NOT INTRACTABLE: ICD-10-CM

## 2025-01-30 DIAGNOSIS — M99.02 SOMATIC DYSFUNCTION OF THORACIC REGION: ICD-10-CM

## 2025-01-30 DIAGNOSIS — M99.01 SOMATIC DYSFUNCTION OF CERVICAL REGION: ICD-10-CM

## 2025-01-30 DIAGNOSIS — M99.00 SOMATIC DYSFUNCTION OF HEAD REGION: Primary | ICD-10-CM

## 2025-01-30 DIAGNOSIS — M99.08 SOMATIC DYSFUNCTION OF RIB CAGE REGION: ICD-10-CM

## 2025-01-30 PROCEDURE — 98926 OSTEOPATH MANJ 3-4 REGIONS: CPT | Performed by: FAMILY MEDICINE

## 2025-01-30 NOTE — PROGRESS NOTES
The Assessment & Plan     This is a 19 y.o. female who presents for OMT follow-up for:  1. Somatic dysfunction of head region  OMT      2. Chronic migraine with aura without status migrainosus, not intractable  OMT      3. Somatic dysfunction of cervical region  OMT      4. Somatic dysfunction of thoracic region  OMT      5. Somatic dysfunction of rib cage region  OMT           1. Patient tolerated OMT well for the above problems,  advised patient to drink fluids and can use NSAID for soreness after treatment     2. OMT Follow up in 3 weeks.    Dionna Salinas is a 19 y.o. female and is here for a OMT follow up. The patient reports some relief in her headaches after her last OMT session.  She did also follow-up with neurology and is trialing some alternate medications for her headaches.  She does admit to some continued upper back and neck tightness which may be contributing to her headaches.  She does admit she has not been able to exercise as much she would like recently due to her scheduling and believes this is likely adding to her headache and tightness as well.  Denies any red flag symptoms or headaches today.  No numbness or tingling into her upper extremities.    Is the patient taking Pain medication? no  Has the patient completed physical therapy for this condition? no  Did Patient symptoms improve from last OMT appointment? yes    The following portions of the patient's history were reviewed and updated as appropriate: allergies, current medications, past family history, past medical history, past social history, past surgical history, and problem list.    Review of Systems  Review of Systems   Constitutional:  Negative for chills and fever.   HENT:  Negative for ear pain and sore throat.    Eyes:  Negative for pain and visual disturbance.   Respiratory:  Negative for cough and shortness of breath.    Cardiovascular:  Negative for chest pain and palpitations.   Gastrointestinal:  Negative for  abdominal pain and vomiting.   Genitourinary:  Negative for dysuria and hematuria.   Musculoskeletal:  Positive for neck pain. Negative for arthralgias and back pain.   Skin:  Negative for color change and rash.   Neurological:  Positive for headaches. Negative for seizures and syncope.   Psychiatric/Behavioral:  Negative for confusion and sleep disturbance.    All other systems reviewed and are negative.        Objective     OMT Exam     OMT    Performed by: Benoit Esqueda DO  Authorized by: Benoit Esqueda DO  Universal Protocol:  procedure performed by consultantConsent: Verbal consent obtained.  Consent given by: patient  Patient identity confirmed: verbally with patient      Procedure Details:     Region evaluated and treated:  Head, Cervical, Thoracic and Ribs    Thoracic Information  Thoracic Region: T1 - T4  Head Details:     Examination Method:  Tissue Texture Change, Stability, Laxity, Effusions, Tone, Range of Motion, Contracture, Asymmetry, Misalignment, Crepitation, Defects, Masses and Tenderness, Pain    Severity:  Moderate    Osteopathic Findings:  - OA FSRRL  - R inion tender point    Treatment Method:  Articulatory Treatment, Muscle Energy Treatment, Myofascial Release Treatment, Soft Tissue Treatment and Counterstrain Treatment    Response:  Improved - The somatic dysfunction is improved but not completely resolved.    Cervical Details:     Examination Method:  Tissue Texture Change, Stability, Laxity, Effusions, Tone, Range of Motion, Contracture, Asymmetry, Misalignment, Crepitation, Defects, Masses and Tenderness, Pain    Severity:  Moderate    Osteopathic Findings:  - Bilateral paracervical muscle hypertonicity, right greater than left  -Bilateral scalene muscle hypertonicity, right greater than left  -Bilateral SCM hypertonicity, right greater than left  -Right -C3, C7 tender points noted    Treatment Method:  Counterstrain Treatment, Muscle Energy Treatment, Myofascial Release Treatment  and Soft Tissue Treatment    Response:  Improved - The somatic dysfunction is improved but not completely resolved.    Thoracic T1 - T4 details:     Examination Method:  Tissue Texture Change, Stability, Laxity, Effusions, Tone, Range of Motion, Contracture, Asymmetry, Misalignment, Crepitation, Defects, Masses and Tenderness, Pain    Severity:  Moderate    Osteopathic Findings:  -Bilateral paraspinal muscle hypertonicity, right greater than left  -T4 flexed side bent right rotated right with tender point on the right    Treatment Method:  Counterstrain Treatment, Myofascial Release Treatment, Soft Tissue Treatment and Muscle Energy Treatment    Response:  Improved    Ribs details:     Examination Method:  Tissue Texture Change, Stability, Laxity, Effusions, Tone, Range of Motion, Contracture, Asymmetry, Misalignment, Crepitation, Defects, Masses and Tenderness, Pain    Severity:  Moderate    Osteopathic Findings:  Right first rib elevation tender point noted    Treatment Method:  Articulatory Treatment, Muscle Energy Treatment and Soft Tissue Treatment    Response:  Improved - The somatic dysfunction is improved but not completely resolved.    Total Regions Treated:  4

## 2025-02-04 ENCOUNTER — PATIENT MESSAGE (OUTPATIENT)
Dept: FAMILY MEDICINE CLINIC | Facility: CLINIC | Age: 20
End: 2025-02-04

## 2025-02-04 DIAGNOSIS — F41.1 GENERALIZED ANXIETY DISORDER: ICD-10-CM

## 2025-02-05 ENCOUNTER — TELEPHONE (OUTPATIENT)
Dept: FAMILY MEDICINE CLINIC | Facility: CLINIC | Age: 20
End: 2025-02-05

## 2025-02-05 RX ORDER — ESCITALOPRAM OXALATE 10 MG/1
10 TABLET ORAL DAILY
Start: 2025-02-05

## 2025-02-08 DIAGNOSIS — G43.E09 CHRONIC MIGRAINE WITH AURA WITHOUT STATUS MIGRAINOSUS, NOT INTRACTABLE: ICD-10-CM

## 2025-02-10 RX ORDER — PROPRANOLOL HYDROCHLORIDE 60 MG/1
60 CAPSULE, EXTENDED RELEASE ORAL
Qty: 90 CAPSULE | Refills: 1 | Status: SHIPPED | OUTPATIENT
Start: 2025-02-10

## 2025-02-12 ENCOUNTER — RA CDI HCC (OUTPATIENT)
Dept: OTHER | Facility: HOSPITAL | Age: 20
End: 2025-02-12

## 2025-02-12 NOTE — PROGRESS NOTES
HCC coding opportunities       Chart reviewed, no opportunity found: CHART REVIEWED, NO OPPORTUNITY FOUND        Patients Insurance        Commercial Insurance: [employer]       This is a reminder to assess ((resolve/update/assess)  all HCC (risk adjustment) codes for the year 2025 as patients MONI scores reset to zero with the New year.    Also, just a reminder to please review and assess all other chronic conditions for 2025.

## 2025-02-17 ENCOUNTER — OFFICE VISIT (OUTPATIENT)
Dept: FAMILY MEDICINE CLINIC | Facility: CLINIC | Age: 20
End: 2025-02-17
Payer: COMMERCIAL

## 2025-02-17 VITALS
BODY MASS INDEX: 21.19 KG/M2 | WEIGHT: 127.2 LBS | TEMPERATURE: 98.3 F | SYSTOLIC BLOOD PRESSURE: 100 MMHG | OXYGEN SATURATION: 98 % | HEART RATE: 86 BPM | HEIGHT: 65 IN | RESPIRATION RATE: 16 BRPM | DIASTOLIC BLOOD PRESSURE: 60 MMHG

## 2025-02-17 DIAGNOSIS — D50.9 IRON DEFICIENCY ANEMIA, UNSPECIFIED IRON DEFICIENCY ANEMIA TYPE: ICD-10-CM

## 2025-02-17 DIAGNOSIS — G43.E09 CHRONIC MIGRAINE WITH AURA WITHOUT STATUS MIGRAINOSUS, NOT INTRACTABLE: ICD-10-CM

## 2025-02-17 DIAGNOSIS — E53.8 B12 DEFICIENCY: ICD-10-CM

## 2025-02-17 DIAGNOSIS — B37.31 CANDIDAL VAGINITIS: Primary | ICD-10-CM

## 2025-02-17 DIAGNOSIS — F41.1 GENERALIZED ANXIETY DISORDER: ICD-10-CM

## 2025-02-17 DIAGNOSIS — F41.0 PANIC ATTACK: ICD-10-CM

## 2025-02-17 PROCEDURE — 99214 OFFICE O/P EST MOD 30 MIN: CPT | Performed by: FAMILY MEDICINE

## 2025-02-17 RX ORDER — ESCITALOPRAM OXALATE 5 MG/1
TABLET ORAL
COMMUNITY
Start: 2025-02-13 | End: 2025-02-17

## 2025-02-17 RX ORDER — ESCITALOPRAM OXALATE 10 MG/1
10 TABLET ORAL DAILY
Qty: 90 TABLET | Refills: 1 | Status: SHIPPED | OUTPATIENT
Start: 2025-02-17

## 2025-02-17 RX ORDER — ALPRAZOLAM 0.25 MG/1
0.25 TABLET ORAL 2 TIMES DAILY PRN
Qty: 15 TABLET | Refills: 0 | Status: SHIPPED | OUTPATIENT
Start: 2025-02-17

## 2025-02-17 NOTE — ASSESSMENT & PLAN NOTE
Stable on Lexapro 10 mg and 1/2 a tab of xanax 0.25 mg in the am.   Orders:    escitalopram (LEXAPRO) 10 mg tablet; Take 1 tablet (10 mg total) by mouth daily    ALPRAZolam (XANAX) 0.25 mg tablet; Take 1 tablet (0.25 mg total) by mouth 2 (two) times a day as needed for anxiety    CBC and differential; Future    Vitamin B12; Future    Iron Panel (Includes Ferritin, Iron Sat%, Iron, and TIBC); Future

## 2025-02-17 NOTE — PROGRESS NOTES
Name: Bacilio Salinas      : 2005      MRN: [id number]  Encounter Provider: Airam Rosa MD  Encounter Date: 2025   Encounter department: [facility]  :  Assessment & Plan  Generalized anxiety disorder  Stable on Lexapro 10 mg and 1/2 a tab of xanax 0.25 mg in the am.   Orders:    escitalopram (LEXAPRO) 10 mg tablet; Take 1 tablet (10 mg total) by mouth daily    ALPRAZolam (XANAX) 0.25 mg tablet; Take 1 tablet (0.25 mg total) by mouth 2 (two) times a day as needed for anxiety    CBC and differential; Future    Vitamin B12; Future    Iron Panel (Includes Ferritin, Iron Sat%, Iron, and TIBC); Future    Panic attack    Orders:    ALPRAZolam (XANAX) 0.25 mg tablet; Take 1 tablet (0.25 mg total) by mouth 2 (two) times a day as needed for anxiety    CBC and differential; Future    Vitamin B12; Future    Iron Panel (Includes Ferritin, Iron Sat%, Iron, and TIBC); Future    Candidal vaginitis  Resolved with diflucan       B12 deficiency  Currently on b12 supplements. Labs to be done prior to the next appt  Orders:    CBC and differential; Future    Vitamin B12; Future    Iron deficiency anemia, unspecified iron deficiency anemia type  Currently on iron. Recheck labs prior to the next appointment  Orders:    CBC and differential; Future    Iron Panel (Includes Ferritin, Iron Sat%, Iron, and TIBC); Future    Chronic migraine with aura without status migrainosus, not intractable  Plans to start propanolol. Advised pt to continue iron and hydrate well. Monitor pressures and pulse while on the medication and if symptomatic, contact neurology. Continue prn ubrelvy               History of Present Illness   Here for follow up  Altitude sick symptoms have resolved  Felt lin jefferson starting b12 and iron   Doing well in school but cannot miss any more days  Hasn't started propanolol   Would like to return to the gym   Vaginal irritation resolved with antifungal  Less anxious on 1/2 a tab of Xanax  "0.25 mg daily. Mostly taken on the weekends      Review of Systems   Constitutional:  Positive for fatigue (chronic).   Neurological:  Positive for headaches (chronic). Negative for dizziness and light-headedness.       Objective   /60 (BP Location: Left arm, Patient Position: Sitting, Cuff Size: Adult)   Pulse 86   Temp 98.3 °F (36.8 °C) (Tympanic)   Resp 16   Ht 5' 5\" (1.651 m)   Wt 57.7 kg (127 lb 3.2 oz)   SpO2 98%   BMI 21.17 kg/m²      Physical Exam  Vitals reviewed.   Constitutional:       General: She is not in acute distress.     Appearance: Normal appearance. She is not ill-appearing or toxic-appearing.   HENT:      Head: Normocephalic and atraumatic.   Eyes:      Extraocular Movements: Extraocular movements intact.   Pulmonary:      Effort: Pulmonary effort is normal.   Neurological:      Mental Status: She is alert and oriented to person, place, and time.   Psychiatric:         Mood and Affect: Mood normal.         Behavior: Behavior normal.         "

## 2025-02-17 NOTE — ASSESSMENT & PLAN NOTE
Plans to start propanolol. Advised pt to continue iron and hydrate well. Monitor pressures and pulse while on the medication and if symptomatic, contact neurology. Continue prn ubrelvy

## 2025-03-18 DIAGNOSIS — F41.1 GENERALIZED ANXIETY DISORDER: ICD-10-CM

## 2025-03-18 DIAGNOSIS — F41.0 PANIC ATTACK: ICD-10-CM

## 2025-03-18 RX ORDER — ALPRAZOLAM 0.25 MG
0.25 TABLET ORAL 2 TIMES DAILY PRN
Qty: 15 TABLET | Refills: 0 | Status: SHIPPED | OUTPATIENT
Start: 2025-03-18

## 2025-03-18 RX ORDER — ALPRAZOLAM 0.25 MG
0.25 TABLET ORAL 2 TIMES DAILY PRN
Qty: 15 TABLET | Refills: 0 | Status: SHIPPED | OUTPATIENT
Start: 2025-03-18 | End: 2025-03-18 | Stop reason: SDUPTHER

## 2025-04-07 ENCOUNTER — OFFICE VISIT (OUTPATIENT)
Dept: FAMILY MEDICINE CLINIC | Facility: CLINIC | Age: 20
End: 2025-04-07
Payer: COMMERCIAL

## 2025-04-07 DIAGNOSIS — M99.08 SOMATIC DYSFUNCTION OF RIB CAGE REGION: ICD-10-CM

## 2025-04-07 DIAGNOSIS — M99.01 SOMATIC DYSFUNCTION OF CERVICAL REGION: ICD-10-CM

## 2025-04-07 DIAGNOSIS — G44.89 OTHER HEADACHE SYNDROME: ICD-10-CM

## 2025-04-07 DIAGNOSIS — M99.00 SOMATIC DYSFUNCTION OF HEAD REGION: ICD-10-CM

## 2025-04-07 DIAGNOSIS — M54.2 NECK PAIN: Primary | ICD-10-CM

## 2025-04-07 DIAGNOSIS — M99.07 SOMATIC DYSFUNCTION OF UPPER EXTREMITY: ICD-10-CM

## 2025-04-07 DIAGNOSIS — M99.02 SOMATIC DYSFUNCTION OF THORACIC REGION: ICD-10-CM

## 2025-04-07 PROCEDURE — 98927 OSTEOPATH MANJ 5-6 REGIONS: CPT | Performed by: FAMILY MEDICINE

## 2025-04-10 NOTE — PROGRESS NOTES
The Assessment & Plan     This is a 20 y.o. female who presents for OMT follow-up for:  1. Neck pain  OMT      2. Other headache syndrome  OMT      3. Somatic dysfunction of head region  OMT      4. Somatic dysfunction of cervical region  OMT      5. Somatic dysfunction of thoracic region  OMT      6. Somatic dysfunction of rib cage region  OMT      7. Somatic dysfunction of upper extremity  OMT           1. Patient tolerated OMT well for the above problems,  advised patient to drink fluids and can use NSAID for soreness after treatment     2. OMT Follow up in 4 weeks.    Dionna Salinas is a 20 y.o. female and is here for a OMT follow up. The patient reports doing well overall.  She notes she has continued with some headaches however they seem to be a little better controlled with Ubrelvy.  She has noted some significant neck tightness and upper back pain most notable on the right side.  Denies any numbness, tingling, weakness in her upper extremities.  No new injury or trauma.  Has been exercising and doing home stretching exercises when able.    Is the patient taking Pain medication? no  Has the patient completed physical therapy for this condition? no  Did Patient symptoms improve from last OMT appointment? yes    The following portions of the patient's history were reviewed and updated as appropriate: allergies, current medications, past family history, past medical history, past social history, past surgical history, and problem list.    Review of Systems  Review of Systems   Constitutional:  Negative for chills and fever.   HENT:  Negative for ear pain and sore throat.    Eyes:  Negative for pain and visual disturbance.   Respiratory:  Negative for cough and shortness of breath.    Cardiovascular:  Negative for chest pain and palpitations.   Gastrointestinal:  Negative for abdominal pain and vomiting.   Genitourinary:  Negative for dysuria and hematuria.   Musculoskeletal:  Positive for back  pain and neck pain. Negative for arthralgias.   Skin:  Negative for color change and rash.   Neurological:  Positive for headaches. Negative for seizures and syncope.   Psychiatric/Behavioral:  Negative for decreased concentration and sleep disturbance. The patient is not nervous/anxious.    All other systems reviewed and are negative.        Objective     OMT Exam     OMT    Performed by: Benoit Esqueda DO  Authorized by: Benoit Esqueda DO  Universal Protocol:  procedure performed by consultantConsent: Verbal consent obtained.  Consent given by: patient  Patient identity confirmed: verbally with patient      Procedure Details:     Region evaluated and treated:  Head, Cervical, Thoracic, Ribs and Right Extremities    Extremity Information  Extremities: right upper extremity    Thoracic Information  Thoracic Region: T1 - T4  Head Details:     Examination Method:  Tissue Texture Change, Stability, Laxity, Effusions, Tone, Range of Motion, Contracture, Asymmetry, Misalignment, Crepitation, Defects, Masses and Tenderness, Pain    Severity:  Moderate    Osteopathic Findings:  - OA FSRRL  - R inion tender point    Treatment Method:  Cranial Treatment, Osteopathy in the Cranial Field, Cranial Osteopathy, Muscle Energy Treatment, Myofascial Release Treatment and Soft Tissue Treatment    Response:  Improved - The somatic dysfunction is improved but not completely resolved.    Cervical Details:     Examination Method:  Tissue Texture Change, Stability, Laxity, Effusions, Tone, Range of Motion, Contracture, Tenderness, Pain and Asymmetry, Misalignment, Crepitation, Defects, Masses    Severity:  Moderate    Osteopathic Findings:  - Bilateral paracervical muscle hypertonicity, right greater than left  -C3 flexed side bent right rotated right with tender point on the right  C7 tender point on the right  Bilateral scalene hypertonicity  Bilateral SCM hypertonicity    Treatment Method:  Counterstrain Treatment, Muscle Energy  Treatment, Myofascial Release Treatment and Soft Tissue Treatment    Response:  Improved - The somatic dysfunction is improved but not completely resolved.    Thoracic T1 - T4 details:     Examination Method:  Tissue Texture Change, Stability, Laxity, Effusions, Tone, Range of Motion, Contracture, Asymmetry, Misalignment, Crepitation, Defects, Masses and Tenderness, Pain    Severity:  Moderate    Osteopathic Findings:  Bilateral paraspinal muscle hypertonicity, right greater than left  Bilateral trapezius muscle hypertonicity, right greater than left  Rhomboid tender point on the right    Treatment Method:  Counterstrain Treatment, Muscle Energy Treatment, Myofascial Release Treatment and Soft Tissue Treatment    Response:  Improved    Right Upper Extremity details:     Examination Method:  Tissue Texture Change, Stability, Laxity, Effusions, Tone, Range of Motion, Contracture, Asymmetry, Misalignment, Crepitation, Defects, Masses and Tenderness, Pain    Severity:  Moderate    Osteopathic Findings:  Decreased range of motion in external rotation, deltoid hypertonicity noted and tender point    Treatment Method:  Counterstrain Treatment, Muscle Energy Treatment, Myofascial Release Treatment, Soft Tissue Treatment and Articulatory Treatment    Response:  Improved - The somatic dysfunction is improved but not completely resolved.    Ribs details:     Examination Method:  Tissue Texture Change, Stability, Laxity, Effusions, Tone, Range of Motion, Contracture, Asymmetry, Misalignment, Crepitation, Defects, Masses and Tenderness, Pain    Severity:  Moderate    Osteopathic Findings:  First rib elevation on the right and tender point noted    Treatment Method:  Articulatory Treatment, Muscle Energy Treatment, Myofascial Release Treatment and Soft Tissue Treatment    Response:  Improved - The somatic dysfunction is improved but not completely resolved.    Total Regions Treated:  5

## 2025-04-22 ENCOUNTER — PATIENT MESSAGE (OUTPATIENT)
Dept: NEUROLOGY | Facility: CLINIC | Age: 20
End: 2025-04-22

## 2025-04-22 DIAGNOSIS — G43.E09 CHRONIC MIGRAINE WITH AURA WITHOUT STATUS MIGRAINOSUS, NOT INTRACTABLE: Primary | ICD-10-CM

## 2025-04-22 RX ORDER — TOPIRAMATE 25 MG/1
TABLET, FILM COATED ORAL
Qty: 60 TABLET | Refills: 5 | Status: SHIPPED | OUTPATIENT
Start: 2025-04-22 | End: 2025-05-29

## 2025-05-02 DIAGNOSIS — F41.1 GENERALIZED ANXIETY DISORDER: ICD-10-CM

## 2025-05-02 DIAGNOSIS — F41.0 PANIC ATTACK: ICD-10-CM

## 2025-05-05 RX ORDER — ALPRAZOLAM 0.25 MG
0.25 TABLET ORAL 2 TIMES DAILY PRN
Qty: 15 TABLET | Refills: 0 | Status: SHIPPED | OUTPATIENT
Start: 2025-05-05

## 2025-05-05 NOTE — TELEPHONE ENCOUNTER
Medication:  PDMP   03/18/2025 03/18/2025 ALPRAZolam (Tablet) 15.0 7 0.25 MG NA ANGIE PRECIADO     Active agreement on file -No

## 2025-05-06 ENCOUNTER — OFFICE VISIT (OUTPATIENT)
Dept: SLEEP CENTER | Facility: CLINIC | Age: 20
End: 2025-05-06
Payer: COMMERCIAL

## 2025-05-06 VITALS
DIASTOLIC BLOOD PRESSURE: 70 MMHG | HEART RATE: 78 BPM | HEIGHT: 65 IN | OXYGEN SATURATION: 99 % | SYSTOLIC BLOOD PRESSURE: 112 MMHG | BODY MASS INDEX: 22.99 KG/M2 | WEIGHT: 138 LBS

## 2025-05-06 DIAGNOSIS — J35.1 TONSILLAR HYPERTROPHY: ICD-10-CM

## 2025-05-06 DIAGNOSIS — F51.04 CHRONIC INSOMNIA: ICD-10-CM

## 2025-05-06 DIAGNOSIS — G47.00 FREQUENT NOCTURNAL AWAKENING: ICD-10-CM

## 2025-05-06 DIAGNOSIS — G47.19 EXCESSIVE DAYTIME SLEEPINESS: ICD-10-CM

## 2025-05-06 DIAGNOSIS — R06.83 SNORING: Primary | ICD-10-CM

## 2025-05-06 PROCEDURE — 99204 OFFICE O/P NEW MOD 45 MIN: CPT

## 2025-05-06 NOTE — PATIENT INSTRUCTIONS
- An at home sleep study has been ordered to evaluate for sleep apnea.  This test should be scheduled at your earliest convenience.    Sleep Hygiene  TIPS FOR A BETTER NIGHT OF SLEEP BEFORE GETTING INTO BED:  -Establish a regular routine for bedtime.  -Create a positive sleep environment.  -Relax before getting into bed.  -Avoid alcohol, smoking, caffeine for at least a few hours before bedtime.  -Do not go to bed unless you are sleepy.    WHILE IN BED:  -Turn your clock around (or cover it) and use your alarm if needed.  If you can't fall asleep in 20 minutes (based on your internal sense of time), get out of bed and do something relaxing or boring (reading, listening to music, etc). Return to bed only when sleepy.  -Use your bed only for sleep.    IN THE MORNING AND DURING THE DAYTIME:  -Wake up at the same time every morning, even on weekends.  -Avoid naps during the day.  -Avoid caffeinated beverages and food in the evening.  -Exercise regularly but not within 4 hours of bedtime.      - Avoid driving is drowsy.  It is recommended that if you are dozing while driving that you do not drive until your sleepiness is appropriately treated.  - It is important to lead a healthy lifestyle with adequate sleep (7-9 hours per night), balanced diet and routine exercise.

## 2025-05-06 NOTE — PROGRESS NOTES
[facility]  Sleep Medicine New Patient Evaluation    PATIENT NAME: Bacilio Salinas  DATE OF SERVICE: May 7, 2025    CONSULTING PROVIDER:  Juan Antonio Andino DO  [address] [place] [address]    ASSESSMENT/PLAN:  Bacilio Salinas is a 20 y.o. female with PMHx of migraines, depression and anxiety who presents for sleep evaluation.    Excessive daytime sleepiness  -The patient notes daytime sleepiness which is impacting her ability to function on a regular basis.  There are multiple potential contributors including insomnia, potential undiagnosed sleep disordered breathing or PLMD or underlying disorder of hypersomnia.  She does have some features consistent with NT2 or IH with sleep paralysis and hypnagogic hallucinations and lack of improvement with naps.  PSG/MSLT may be difficult to obtain given her insomnia and concern that she would not be able to hold her escitalopram currently.  - We discussed the importance of avoiding drowsy driving, she is amenable to taking naps prior to driving when she feels drowsy.  - Will start with HSAT given tonsillar hypertrophy and snoring, if this is unremarkable would optimize insomnia is much as possible and readdress ability to hold escitalopram for 2 weeks prior to PSG/MSLT.  - Follow-up after testing.  -     Home Sleep Study; Future    Snoring  Tonsillar hypertrophy  Frequent nocturnal awakening  - While the patient is a healthy weight she has significant tonsillar hypertrophy with loud snoring and gasping/choking in sleep in addition to daytime sleepiness as noted above which is concerning for sleep apnea.  - Will obtain HSAT.  - Discussed with the patient the possible diagnosis, causes and conditions associated with SDB along with potential treatments.  - Encouraged healthy lifestyle with adequate sleep (7-9 hours per night), healthy balanced diet and routine exercise.  -     Home Sleep Study; Future    Chronic insomnia  - Likely multifactorial in  "etiology with behavioral aspects (lack of stimulus control) he, psychophysiologic contributors (anxiety) and potentially undiagnosed sleep disordered breathing or PLMD.  - Plan for testing as above.  - Discussed the importance of sleep hygiene and stimulus control, recommend that she narrow her window in bed to 7-8 as it is taking up to 2 hours to routinely fall asleep she is spending 10 hours in bed most nights.  We discussed the importance of not getting into bed until she is truly sleepy.  Would not recommend true sleep restriction until EDS is improved.  -     Ambulatory Referral to Sleep Medicine    Thank you for allowing me to participate in the care of your patient.  If there are any questions regarding evaluation please feel free to reach out.   ________________________________________________________________________________________________    HPI: Bacilio Salinas is a 20 y.o. female with PMHx of migraines, depression and anxiety who presents for sleep evaluation.  Sleep-Related History: No prior sleep studies or sleep consults.    The patient was referred by her neurologist due to insomnia.  She reports that she has both difficulty falling asleep and frequent nocturnal awakenings with difficulty falling back to sleep.  As a result of this she is having significant daytime sleepiness and notes napping for up to 2 hours at times.  Even on the weekends when she can sleep and she does not feel fully rested and reports feeling unchanged after naps.  Her mother is present with her in the office today and notes that she was also sleepy dating back to middle and high school and would frequently nap after school.  She denies being a \"night owl\".  Additionally she denies cataplexy or sleep inertia, but has had episodes of seeing shadows out of the corner of her eye as she is falling asleep that last occurred 2 months ago and sleep paralysis which last occurred 6 months ago stopping around the time she restarted her " escitalopram.  She does also report loud snoring episodes of choking/gasping in sleep and morning headaches.  She has been told in the past that she has enlarged tonsils and was previously recommended by an ENT to have them removed reportedly due to their size.  She states she did not have the procedure completed due to concern about recovery time and her currently being in school.  She believes she last saw ENT 1 to 2 years ago.    ESS: Total score: 6/24  Greater or equal to 10 is positive for excessive daytime sleepiness  Pertinent Meds: escitalopram 10mg qd, alprazolam 0.25mg BID PRN    Sleep-Wake Schedule:  She is self-described as having no morning/night preference.  Bedtime: 2100  Wake Time: 0700  Difficulty Falling Asleep: Yes, notes 2-2.5 hours to fall asleep at night, she feels more fatigued than sleepy when first getting into bed  Avg Number of Awakenings: 3-4x a night -- states it can take 30-60 min to fall back to sleep  Cause of Awakenings: mostly unclear, occasionally to use the restroom  Weekend Sleep Schedule: 3747-4145  Naps: 3-4x a week for 2 hours, she feels unchanged after a nap    Avg TST per 24 hours: 6 hours    Sleep-Related Details:  Preferred Sleep Position: supine  SDB Symptoms: snoring, gasping/choking, multiple awakenings, morning headaches, and waking unrefreshed  Bruxism: No  Nocturnal GERD: No  Nocturnal Palpitations: No  Nocturnal Anxiety or Rumination: Yes, notes anxious thoughts while laying in bed at night  Sleep-Related Hallucinations: Yes, has seen shadows out of the corner of her eye while falling asleep, last happened ~2 months ago, occurred roughly 1x a week    Sleep Paralysis: Yes, has had this occur regularly in the past, but hasn't occurred in the last 6 months    Cataplexy: No     She denies having an urge to move the legs in the evening (when resting) nor uncomfortable and/or unpleasant sensations in the legs. She has been told that she kicks her legs during sleep as a  child, unclear if this is still occuring.     She denies any parasomnia activity.    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   She is willing to start napping prior to driving when feeling drowsy.  Caffeine: Yes, 2x a week a 16oz soda  Alcohol Use: No  Substance Use: No  Tobacco Use: No  Weight Change: gained ~20lbs over the past 3 months (had lost weight previously)    She does not have difficulty with memory or concentration.    Past Treatments:  None    Prior Sleep Studies:  None    Other Relevant Labs and Studies:  None    Past Medical History:   Diagnosis Date    Migraines      Past Surgical History:   Procedure Laterality Date    WISDOM TOOTH EXTRACTION       Patient Active Problem List   Diagnosis    Other headache syndrome    Oral contraceptive pill surveillance    Severe episode of recurrent major depressive disorder, without psychotic features (HCC)    Generalized anxiety disorder    Fracture of right tibial plateau    Family history of cancer    Headache    Chronic migraine with aura without status migrainosus, not intractable    Insomnia    Cervicalgia     Allergies as of 05/06/2025    (No Known Allergies)     REVIEW OF SYSTEMS:  Review of Systems  10-point system review completed, all of which are negative except as mentioned above.    CURRENT MEDICATIONS:  Current Outpatient Medications   Medication Instructions    ALPRAZolam (XANAX) 0.25 mg, Oral, 2 times daily PRN    escitalopram (LEXAPRO) 10 mg, Oral, Daily    topiramate (TOPAMAX) 25 mg tablet Take 1 tablet (25 mg total) by mouth daily at bedtime for 7 days, THEN 2 tablets (50 mg total) daily at bedtime.    Ubrogepant (UBRELVY) 100 MG tablet Take 1 tablet (100 mg) one time as needed for migraine. May repeat one additional tablet (100  "mg) at least two hours after the first dose. Do not use more than two doses per day, or for more than eight days per month.       SOCIAL HISTORY:  Social History     Tobacco Use    Smoking status: Never     Passive exposure: Never    Smokeless tobacco: Never   Vaping Use    Vaping status: Never Used   Substance Use Topics    Alcohol use: Yes     Comment: social    Drug use: Never     FAMILY HISTORY:  Family History   Problem Relation Age of Onset    Migraines Mother     Depression Father     Anxiety disorder Father     Anxiety disorder Sister     Depression Sister     No Known Problems Brother     No Known Problems Brother     No Known Problems Brother     Cancer Maternal Grandmother     Thyroid disease Maternal Grandmother     Uterine cancer Maternal Grandmother     Heart disease Maternal Grandmother     No Known Problems Maternal Grandfather     No Known Problems Paternal Grandmother     No Known Problems Paternal Grandfather     Uterine cancer Other     Uterine cancer Other     Mental illness Neg Hx     Substance Abuse Neg Hx      Family History of Sleep Disorders: maternal grandmother sleep apnea    PHYSICAL EXAMINATION:  Vital Signs:  /70 (BP Location: Left arm, Patient Position: Sitting, Cuff Size: Adult)   Pulse 78   Ht 5' 5\" (1.651 m)   Wt 62.6 kg (138 lb)   SpO2 99%   BMI 22.96 kg/m²   Body mass index is 22.96 kg/m².    Constitutional: NAD, well appearing   Mental Status: AAOx3  Neck Circumference:   inches  Skin: Warm, dry, no rashes noted   Eyes: PERRL, normal conjunctiva  ENT: Nasal congestion absent, nasal valve incompetence absent.  Posterior Airspace:   Vicente Tongue Position: 4  Retrognathia: absent  Overbite: absent  High Arched Palate: absent  Tongue Scalloping/Ridging: absent  Tonsils: 3+  Uvula: normal  Chest: RRR, +S1/S2, CTA B/L, no W/R/R, no M/R/g   Extremities: No digital clubbing or pedal edema      Carmen Tracy MD  Pulmonary-Critical Care and Sleep " "Medicine  05/07/25    Portions of the record may have been created with voice recognition software. Occasional wrong word or \"sound a like\" substitutions may have occurred due to the inherent limitations of voice recognition software. Please read the chart carefully and recognize, using context, where substitutions have occurred.   "

## 2025-06-12 DIAGNOSIS — F41.1 GENERALIZED ANXIETY DISORDER: ICD-10-CM

## 2025-06-12 DIAGNOSIS — F41.0 PANIC ATTACK: ICD-10-CM

## 2025-06-12 RX ORDER — ALPRAZOLAM 0.25 MG
0.25 TABLET ORAL 2 TIMES DAILY PRN
Qty: 15 TABLET | Refills: 0 | Status: SHIPPED | OUTPATIENT
Start: 2025-06-12

## 2025-06-12 NOTE — TELEPHONE ENCOUNTER
Medication:  PDMP   05/05/2025 05/05/2025 ALPRAZolam (Tablet) 15.0 7 0.25 MG NA NICOLA SANCHEZ     Active agreement on file -No

## 2025-07-07 ENCOUNTER — PATIENT MESSAGE (OUTPATIENT)
Dept: FAMILY MEDICINE CLINIC | Facility: CLINIC | Age: 20
End: 2025-07-07

## 2025-07-07 DIAGNOSIS — G43.E09 CHRONIC MIGRAINE WITH AURA WITHOUT STATUS MIGRAINOSUS, NOT INTRACTABLE: ICD-10-CM

## 2025-07-08 ENCOUNTER — PATIENT MESSAGE (OUTPATIENT)
Dept: FAMILY MEDICINE CLINIC | Facility: CLINIC | Age: 20
End: 2025-07-08

## 2025-07-08 DIAGNOSIS — F41.0 PANIC ATTACK: ICD-10-CM

## 2025-07-08 DIAGNOSIS — F41.1 GENERALIZED ANXIETY DISORDER: ICD-10-CM

## 2025-07-08 DIAGNOSIS — G43.E09 CHRONIC MIGRAINE WITH AURA WITHOUT STATUS MIGRAINOSUS, NOT INTRACTABLE: ICD-10-CM

## 2025-07-08 RX ORDER — ALPRAZOLAM 0.25 MG
0.25 TABLET ORAL 2 TIMES DAILY PRN
Qty: 30 TABLET | Refills: 0 | Status: SHIPPED | OUTPATIENT
Start: 2025-07-08

## 2025-07-09 ENCOUNTER — HOSPITAL ENCOUNTER (OUTPATIENT)
Dept: SLEEP CENTER | Facility: CLINIC | Age: 20
Discharge: HOME | End: 2025-07-09
Payer: COMMERCIAL

## 2025-07-09 DIAGNOSIS — R06.83 SNORING: ICD-10-CM

## 2025-07-09 DIAGNOSIS — G47.00 FREQUENT NOCTURNAL AWAKENING: ICD-10-CM

## 2025-07-09 DIAGNOSIS — G47.19 EXCESSIVE DAYTIME SLEEPINESS: ICD-10-CM

## 2025-07-09 PROCEDURE — G0399 HOME SLEEP TEST/TYPE 3 PORTA: HCPCS

## 2025-07-09 NOTE — PROGRESS NOTES
Home Sleep Study Documentation    HOME STUDY DEVICE: Noxturnal no                                           Eugenia G3 yes device # 21      Pre-Sleep Home Study:    Set-up and instructions performed by: Ginna    Technician performed demonstration for Patient: yes    Return demonstration performed by Patient: yes    Written instructions provided to Patient: yes    Patient signed consent form: yes          Post-Sleep Home Study:    Post Test Questionnaire Data: Pending    Additional comments by Patient: pending    Home Sleep Study Failed:pending    Failure reason: pending    Reported or Detected: pending    Scored by: pending

## 2025-07-18 PROBLEM — G47.33 OSA (OBSTRUCTIVE SLEEP APNEA): Status: ACTIVE | Noted: 2025-07-18

## 2025-07-21 PROBLEM — R06.83 SNORING: Status: ACTIVE | Noted: 2025-07-21

## 2025-07-21 PROBLEM — G47.19 EXCESSIVE DAYTIME SLEEPINESS: Status: ACTIVE | Noted: 2025-07-21

## 2025-07-22 ENCOUNTER — RESULTS FOLLOW-UP (OUTPATIENT)
Dept: PULMONOLOGY | Facility: CLINIC | Age: 20
End: 2025-07-22

## 2025-07-24 ENCOUNTER — OFFICE VISIT (OUTPATIENT)
Dept: FAMILY MEDICINE CLINIC | Facility: CLINIC | Age: 20
End: 2025-07-24
Payer: COMMERCIAL

## 2025-07-24 VITALS
SYSTOLIC BLOOD PRESSURE: 118 MMHG | HEART RATE: 83 BPM | BODY MASS INDEX: 24.72 KG/M2 | OXYGEN SATURATION: 97 % | DIASTOLIC BLOOD PRESSURE: 58 MMHG | WEIGHT: 148.4 LBS | HEIGHT: 65 IN

## 2025-07-24 DIAGNOSIS — F41.0 PANIC ATTACK: ICD-10-CM

## 2025-07-24 DIAGNOSIS — Z23 ENCOUNTER FOR IMMUNIZATION: ICD-10-CM

## 2025-07-24 DIAGNOSIS — F41.1 GENERALIZED ANXIETY DISORDER: ICD-10-CM

## 2025-07-24 DIAGNOSIS — Z00.00 ANNUAL PHYSICAL EXAM: Primary | ICD-10-CM

## 2025-07-24 PROCEDURE — 90715 TDAP VACCINE 7 YRS/> IM: CPT

## 2025-07-24 PROCEDURE — 90471 IMMUNIZATION ADMIN: CPT

## 2025-07-24 PROCEDURE — 99395 PREV VISIT EST AGE 18-39: CPT | Performed by: FAMILY MEDICINE

## 2025-07-24 RX ORDER — ESCITALOPRAM OXALATE 20 MG/1
20 TABLET ORAL DAILY
Qty: 30 TABLET | Refills: 1 | Status: SHIPPED | OUTPATIENT
Start: 2025-07-24

## 2025-07-24 NOTE — PROGRESS NOTES
Adult Annual Physical  Name: Bacilio Salinas      : 2005      MRN: [id number]  Encounter Provider: Airam Rosa MD  Encounter Date: 2025   Encounter department: [facility]    :  Assessment & Plan  Encounter for immunization  Due for Tdap. Recommended especially with being in clinicals. Vaccine consented and administered   Orders:    TDAP VACCINE GREATER THAN OR EQUAL TO 8YO IM    Generalized anxiety disorder  Anxiety has gotten worse in the past week. 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 Currently on lexapro 10 mg. Asked to increase the dose to 20 mg daily. May continue taking xanax BID prn. If mood doesn't improve, suggest adding buspar.   Orders:    escitalopram (LEXAPRO) 20 mg tablet; Take 1 tablet (20 mg total) by mouth daily    Annual physical exam  UTD for screening. Tdap administered today        Panic attack  Increasing lexapro to 20 mg and may continue xanax Bid prn with the goal of using it less than once per week            Preventive Screenings:  - Diabetes Screening: screening up-to-date  - Cholesterol Screening: screening not indicated   - Hepatitis C screening: screening up-to-date   - Cervical cancer screening: screening up-to-date   - Colon cancer screening: screening not indicated   - Lung cancer screening: screening not indicated     Immunizations:  - Immunizations due: Tdap and HPV (Gardasil 9)  - Immunizations given per orders      Counseling/Anticipatory Guidance:    - Diet: discussed recommendations for a healthy/well-balanced diet.   - Exercise: the importance of regular exercise/physical activity was discussed. Recommend exercise 3-5 times per week for at least 30 minutes.          History of Present Illness     Adult Annual Physical:  Patient presents for annual physical.   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  Reports panic  "attacks when in public places. Not wanting to go out or hang out with friends.   Stress level is going up as she is nearing the end of nursing school   Taking xanax once a day whereas before she would go weeks without it. This is affecting her performance in clinicals as she is not able to focus. Also her friends have noticed she is more withdrawn .     Diet and Physical Activity:  - Diet/Nutrition: well balanced diet. gaining weight intentionally  - Exercise: no formal exercise.    General Health:  - Sleep: sleeps poorly. waking up frequently. WIll sometimes have dififuclty falling back asleep. Sleep study recently completed  - Hearing: normal hearing bilateral ears.  - Vision: wears glasses.  - Dental: regular dental visits and brushes teeth twice daily. Seen 6 months ago    /GYN Health:  - Follows with GYN: yes.   - Menopause: premenopausal.   - Last menstrual cycle: 5/25/2025.   - History of STDs: no  - Contraception: IUD placement.      Advanced Care Planning:  - Has an advanced directive?: no    - Has a durable medical POA?: no    - ACP document given to patient?: no      Review of Systems   Psychiatric/Behavioral:  The patient is nervous/anxious.          Objective   /58 (BP Location: Left arm, Patient Position: Sitting, Cuff Size: Standard)   Pulse 83   Ht 5' 5\" (1.651 m)   Wt 67.3 kg (148 lb 6.4 oz)   SpO2 97%   BMI 24.70 kg/m²     Physical Exam  Vitals reviewed.   Constitutional:       General: She is not in acute distress.     Appearance: She is not ill-appearing or toxic-appearing.   HENT:      Head: Normocephalic and atraumatic.     Eyes:      Extraocular Movements: Extraocular movements intact.       Cardiovascular:      Rate and Rhythm: Normal rate and regular rhythm.      Heart sounds: No murmur heard.  Pulmonary:      Effort: Pulmonary effort is normal.     Neurological:      Mental Status: She is alert.     Psychiatric:         Attention and Perception: Attention normal.         Mood " and Affect: Mood is anxious.         Speech: Speech normal.         Cognition and Memory: Cognition and memory normal.

## 2025-07-24 NOTE — ASSESSMENT & PLAN NOTE
Anxiety has gotten worse in the past week. 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 Currently on lexapro 10 mg. Asked to increase the dose to 20 mg daily. May continue taking xanax BID prn. If mood doesn't improve, suggest adding buspar.   Orders:    escitalopram (LEXAPRO) 20 mg tablet; Take 1 tablet (20 mg total) by mouth daily

## 2025-08-22 DIAGNOSIS — F41.1 GENERALIZED ANXIETY DISORDER: ICD-10-CM

## 2025-08-22 RX ORDER — ESCITALOPRAM OXALATE 20 MG/1
20 TABLET ORAL DAILY
Qty: 90 TABLET | Refills: 1 | Status: SHIPPED | OUTPATIENT
Start: 2025-08-22